# Patient Record
Sex: FEMALE | Race: WHITE | NOT HISPANIC OR LATINO | Employment: OTHER | URBAN - METROPOLITAN AREA
[De-identification: names, ages, dates, MRNs, and addresses within clinical notes are randomized per-mention and may not be internally consistent; named-entity substitution may affect disease eponyms.]

---

## 2021-01-05 ENCOUNTER — OFFICE VISIT (OUTPATIENT)
Dept: FAMILY MEDICINE | Facility: CLINIC | Age: 81
End: 2021-01-05
Payer: MEDICARE

## 2021-01-05 VITALS
OXYGEN SATURATION: 98 % | DIASTOLIC BLOOD PRESSURE: 68 MMHG | SYSTOLIC BLOOD PRESSURE: 132 MMHG | HEART RATE: 74 BPM | BODY MASS INDEX: 26.8 KG/M2 | WEIGHT: 132.94 LBS | TEMPERATURE: 98 F | HEIGHT: 59 IN

## 2021-01-05 DIAGNOSIS — M65.30 TRIGGER FINGER OF RIGHT HAND, UNSPECIFIED FINGER: ICD-10-CM

## 2021-01-05 DIAGNOSIS — E78.5 HYPERLIPIDEMIA, UNSPECIFIED HYPERLIPIDEMIA TYPE: ICD-10-CM

## 2021-01-05 DIAGNOSIS — Z00.00 ANNUAL PHYSICAL EXAM: Primary | ICD-10-CM

## 2021-01-05 DIAGNOSIS — M15.9 OSTEOARTHRITIS OF MULTIPLE JOINTS, UNSPECIFIED OSTEOARTHRITIS TYPE: ICD-10-CM

## 2021-01-05 DIAGNOSIS — Z95.0 PACEMAKER: ICD-10-CM

## 2021-01-05 PROCEDURE — 99999 PR PBB SHADOW E&M-NEW PATIENT-LVL V: CPT | Mod: PBBFAC,,, | Performed by: INTERNAL MEDICINE

## 2021-01-05 PROCEDURE — 99204 PR OFFICE/OUTPT VISIT, NEW, LEVL IV, 45-59 MIN: ICD-10-PCS | Mod: S$PBB,,, | Performed by: INTERNAL MEDICINE

## 2021-01-05 PROCEDURE — 99204 OFFICE O/P NEW MOD 45 MIN: CPT | Mod: S$PBB,,, | Performed by: INTERNAL MEDICINE

## 2021-01-05 PROCEDURE — 99999 PR PBB SHADOW E&M-NEW PATIENT-LVL V: ICD-10-PCS | Mod: PBBFAC,,, | Performed by: INTERNAL MEDICINE

## 2021-01-05 PROCEDURE — 99205 OFFICE O/P NEW HI 60 MIN: CPT | Mod: PBBFAC,PO | Performed by: INTERNAL MEDICINE

## 2021-01-05 RX ORDER — HYDROGEN PEROXIDE 3 %
10 SOLUTION, NON-ORAL MISCELLANEOUS
COMMUNITY
End: 2023-11-03

## 2021-01-05 RX ORDER — ASPIRIN 325 MG/1
325 TABLET, FILM COATED ORAL DAILY
COMMUNITY
End: 2023-11-03

## 2021-01-05 RX ORDER — NAPROXEN SODIUM 220 MG
220 TABLET ORAL 2 TIMES DAILY WITH MEALS
COMMUNITY
End: 2023-06-08

## 2021-01-06 ENCOUNTER — TELEPHONE (OUTPATIENT)
Dept: ORTHOPEDICS | Facility: CLINIC | Age: 81
End: 2021-01-06

## 2021-01-06 DIAGNOSIS — R52 PAIN: Primary | ICD-10-CM

## 2021-01-07 ENCOUNTER — HOSPITAL ENCOUNTER (OUTPATIENT)
Dept: RADIOLOGY | Facility: OTHER | Age: 81
Discharge: HOME | End: 2021-01-07
Attending: PHYSICIAN ASSISTANT
Payer: MEDICARE

## 2021-01-07 ENCOUNTER — OFFICE VISIT (OUTPATIENT)
Dept: ORTHOPEDICS | Facility: CLINIC | Age: 81
End: 2021-01-07
Payer: MEDICARE

## 2021-01-07 VITALS
WEIGHT: 132.94 LBS | HEART RATE: 78 BPM | HEIGHT: 59 IN | BODY MASS INDEX: 26.8 KG/M2 | SYSTOLIC BLOOD PRESSURE: 153 MMHG | DIASTOLIC BLOOD PRESSURE: 85 MMHG

## 2021-01-07 DIAGNOSIS — R52 PAIN: ICD-10-CM

## 2021-01-07 DIAGNOSIS — M65.331 TRIGGER MIDDLE FINGER OF RIGHT HAND: ICD-10-CM

## 2021-01-07 PROCEDURE — 99203 PR OFFICE/OUTPT VISIT, NEW, LEVL III, 30-44 MIN: ICD-10-PCS | Mod: 25,S$PBB,, | Performed by: PHYSICIAN ASSISTANT

## 2021-01-07 PROCEDURE — 99203 OFFICE O/P NEW LOW 30 MIN: CPT | Mod: 25,S$PBB,, | Performed by: PHYSICIAN ASSISTANT

## 2021-01-07 PROCEDURE — 73130 X-RAY EXAM OF HAND: CPT | Mod: TC,FY,RT

## 2021-01-07 PROCEDURE — 73130 X-RAY EXAM OF HAND: CPT | Mod: 26,RT,, | Performed by: RADIOLOGY

## 2021-01-07 PROCEDURE — 73130 XR HAND COMPLETE 3 VIEW RIGHT: ICD-10-PCS | Mod: 26,RT,, | Performed by: RADIOLOGY

## 2021-01-07 PROCEDURE — 20550 NJX 1 TENDON SHEATH/LIGAMENT: CPT | Mod: S$PBB,F7,, | Performed by: PHYSICIAN ASSISTANT

## 2021-01-07 PROCEDURE — 99214 OFFICE O/P EST MOD 30 MIN: CPT | Mod: PBBFAC,25 | Performed by: PHYSICIAN ASSISTANT

## 2021-01-07 PROCEDURE — 20550 NJX 1 TENDON SHEATH/LIGAMENT: CPT | Mod: PBBFAC | Performed by: PHYSICIAN ASSISTANT

## 2021-01-07 PROCEDURE — 20550 PR INJECT TENDON SHEATH/LIGAMENT: ICD-10-PCS | Mod: S$PBB,F7,, | Performed by: PHYSICIAN ASSISTANT

## 2021-01-07 PROCEDURE — 99999 PR PBB SHADOW E&M-EST. PATIENT-LVL IV: CPT | Mod: PBBFAC,,, | Performed by: PHYSICIAN ASSISTANT

## 2021-01-07 PROCEDURE — 99999 PR PBB SHADOW E&M-EST. PATIENT-LVL IV: ICD-10-PCS | Mod: PBBFAC,,, | Performed by: PHYSICIAN ASSISTANT

## 2021-01-07 RX ORDER — LIDOCAINE HYDROCHLORIDE 10 MG/ML
0.5 INJECTION, SOLUTION EPIDURAL; INFILTRATION; INTRACAUDAL; PERINEURAL
Status: COMPLETED | OUTPATIENT
Start: 2021-01-07 | End: 2021-01-07

## 2021-01-07 RX ORDER — DEXAMETHASONE SODIUM PHOSPHATE 4 MG/ML
4 INJECTION, SOLUTION INTRA-ARTICULAR; INTRALESIONAL; INTRAMUSCULAR; INTRAVENOUS; SOFT TISSUE
Status: COMPLETED | OUTPATIENT
Start: 2021-01-07 | End: 2021-01-07

## 2021-01-07 RX ADMIN — LIDOCAINE HYDROCHLORIDE 5 MG: 10 INJECTION, SOLUTION EPIDURAL; INFILTRATION; INTRACAUDAL; PERINEURAL at 04:01

## 2021-01-07 RX ADMIN — DEXAMETHASONE SODIUM PHOSPHATE 4 MG: 4 INJECTION INTRA-ARTICULAR; INTRALESIONAL; INTRAMUSCULAR; INTRAVENOUS; SOFT TISSUE at 04:01

## 2021-01-08 ENCOUNTER — IMMUNIZATION (OUTPATIENT)
Dept: INTERNAL MEDICINE | Facility: CLINIC | Age: 81
End: 2021-01-08
Payer: MEDICARE

## 2021-01-08 ENCOUNTER — OFFICE VISIT (OUTPATIENT)
Dept: OTOLARYNGOLOGY | Facility: CLINIC | Age: 81
End: 2021-01-08
Payer: MEDICARE

## 2021-01-08 VITALS
BODY MASS INDEX: 26.66 KG/M2 | WEIGHT: 132 LBS | HEART RATE: 78 BPM | SYSTOLIC BLOOD PRESSURE: 154 MMHG | DIASTOLIC BLOOD PRESSURE: 94 MMHG

## 2021-01-08 DIAGNOSIS — Z00.00 ANNUAL PHYSICAL EXAM: ICD-10-CM

## 2021-01-08 DIAGNOSIS — Z23 NEED FOR VACCINATION: ICD-10-CM

## 2021-01-08 PROCEDURE — 99999 PR PBB SHADOW E&M-EST. PATIENT-LVL III: ICD-10-PCS | Mod: PBBFAC,,, | Performed by: OTOLARYNGOLOGY

## 2021-01-08 PROCEDURE — 99499 NO LOS: ICD-10-PCS | Mod: S$PBB,,, | Performed by: OTOLARYNGOLOGY

## 2021-01-08 PROCEDURE — 99499 UNLISTED E&M SERVICE: CPT | Mod: S$PBB,,, | Performed by: OTOLARYNGOLOGY

## 2021-01-08 PROCEDURE — 99213 OFFICE O/P EST LOW 20 MIN: CPT | Mod: PBBFAC | Performed by: OTOLARYNGOLOGY

## 2021-01-08 PROCEDURE — 99999 PR PBB SHADOW E&M-EST. PATIENT-LVL III: CPT | Mod: PBBFAC,,, | Performed by: OTOLARYNGOLOGY

## 2021-01-08 PROCEDURE — 69210 PR REMOVAL IMPACTED CERUMEN REQUIRING INSTRUMENTATION, UNILATERAL: ICD-10-PCS | Mod: S$PBB,,, | Performed by: OTOLARYNGOLOGY

## 2021-01-08 PROCEDURE — 91300 COVID-19, MRNA, LNP-S, PF, 30 MCG/0.3 ML DOSE VACCINE: CPT | Mod: PBBFAC

## 2021-01-08 PROCEDURE — 69210 REMOVE IMPACTED EAR WAX UNI: CPT | Mod: 50,PBBFAC | Performed by: OTOLARYNGOLOGY

## 2021-01-08 PROCEDURE — 69210 REMOVE IMPACTED EAR WAX UNI: CPT | Mod: S$PBB,,, | Performed by: OTOLARYNGOLOGY

## 2021-01-12 ENCOUNTER — LAB VISIT (OUTPATIENT)
Dept: LAB | Facility: HOSPITAL | Age: 81
End: 2021-01-12
Attending: INTERNAL MEDICINE
Payer: MEDICARE

## 2021-01-12 ENCOUNTER — TELEPHONE (OUTPATIENT)
Dept: FAMILY MEDICINE | Facility: CLINIC | Age: 81
End: 2021-01-12

## 2021-01-12 ENCOUNTER — PATIENT MESSAGE (OUTPATIENT)
Dept: HEPATOLOGY | Facility: HOSPITAL | Age: 81
End: 2021-01-12

## 2021-01-12 DIAGNOSIS — E78.5 HYPERLIPIDEMIA, UNSPECIFIED HYPERLIPIDEMIA TYPE: Primary | ICD-10-CM

## 2021-01-12 DIAGNOSIS — Z00.00 ANNUAL PHYSICAL EXAM: ICD-10-CM

## 2021-01-12 LAB
25(OH)D3+25(OH)D2 SERPL-MCNC: 34 NG/ML (ref 30–96)
ALBUMIN SERPL BCP-MCNC: 3.7 G/DL (ref 3.5–5.2)
ALP SERPL-CCNC: 72 U/L (ref 55–135)
ALT SERPL W/O P-5'-P-CCNC: 14 U/L (ref 10–44)
ANION GAP SERPL CALC-SCNC: 8 MMOL/L (ref 8–16)
AST SERPL-CCNC: 20 U/L (ref 10–40)
BASOPHILS # BLD AUTO: 0.06 K/UL (ref 0–0.2)
BASOPHILS NFR BLD: 1.3 % (ref 0–1.9)
BILIRUB SERPL-MCNC: 0.9 MG/DL (ref 0.1–1)
BUN SERPL-MCNC: 22 MG/DL (ref 8–23)
CALCIUM SERPL-MCNC: 9.4 MG/DL (ref 8.7–10.5)
CHLORIDE SERPL-SCNC: 101 MMOL/L (ref 95–110)
CHOLEST SERPL-MCNC: 292 MG/DL (ref 120–199)
CHOLEST/HDLC SERPL: 4.8 {RATIO} (ref 2–5)
CO2 SERPL-SCNC: 30 MMOL/L (ref 23–29)
CREAT SERPL-MCNC: 0.7 MG/DL (ref 0.5–1.4)
DIFFERENTIAL METHOD: ABNORMAL
EOSINOPHIL # BLD AUTO: 0.2 K/UL (ref 0–0.5)
EOSINOPHIL NFR BLD: 4.6 % (ref 0–8)
ERYTHROCYTE [DISTWIDTH] IN BLOOD BY AUTOMATED COUNT: 13.5 % (ref 11.5–14.5)
EST. GFR  (AFRICAN AMERICAN): >60 ML/MIN/1.73 M^2
EST. GFR  (NON AFRICAN AMERICAN): >60 ML/MIN/1.73 M^2
ESTIMATED AVG GLUCOSE: 100 MG/DL (ref 68–131)
GLUCOSE SERPL-MCNC: 90 MG/DL (ref 70–110)
HBA1C MFR BLD HPLC: 5.1 % (ref 4–5.6)
HCT VFR BLD AUTO: 48.3 % (ref 37–48.5)
HDLC SERPL-MCNC: 61 MG/DL (ref 40–75)
HDLC SERPL: 20.9 % (ref 20–50)
HGB BLD-MCNC: 15 G/DL (ref 12–16)
IMM GRANULOCYTES # BLD AUTO: 0 K/UL (ref 0–0.04)
IMM GRANULOCYTES NFR BLD AUTO: 0 % (ref 0–0.5)
LDLC SERPL CALC-MCNC: 202.8 MG/DL (ref 63–159)
LYMPHOCYTES # BLD AUTO: 1.1 K/UL (ref 1–4.8)
LYMPHOCYTES NFR BLD: 24.6 % (ref 18–48)
MCH RBC QN AUTO: 29.8 PG (ref 27–31)
MCHC RBC AUTO-ENTMCNC: 31.1 G/DL (ref 32–36)
MCV RBC AUTO: 96 FL (ref 82–98)
MONOCYTES # BLD AUTO: 0.4 K/UL (ref 0.3–1)
MONOCYTES NFR BLD: 8.7 % (ref 4–15)
NEUTROPHILS # BLD AUTO: 2.8 K/UL (ref 1.8–7.7)
NEUTROPHILS NFR BLD: 60.8 % (ref 38–73)
NONHDLC SERPL-MCNC: 231 MG/DL
NRBC BLD-RTO: 0 /100 WBC
PLATELET # BLD AUTO: 326 K/UL (ref 150–350)
PMV BLD AUTO: 9.8 FL (ref 9.2–12.9)
POTASSIUM SERPL-SCNC: 4 MMOL/L (ref 3.5–5.1)
PROT SERPL-MCNC: 7.3 G/DL (ref 6–8.4)
RBC # BLD AUTO: 5.03 M/UL (ref 4–5.4)
SODIUM SERPL-SCNC: 139 MMOL/L (ref 136–145)
TRIGL SERPL-MCNC: 141 MG/DL (ref 30–150)
TSH SERPL DL<=0.005 MIU/L-ACNC: 3.46 UIU/ML (ref 0.4–4)
WBC # BLD AUTO: 4.6 K/UL (ref 3.9–12.7)

## 2021-01-12 PROCEDURE — 80061 LIPID PANEL: CPT

## 2021-01-12 PROCEDURE — 85025 COMPLETE CBC W/AUTO DIFF WBC: CPT

## 2021-01-12 PROCEDURE — 82306 VITAMIN D 25 HYDROXY: CPT

## 2021-01-12 PROCEDURE — 83036 HEMOGLOBIN GLYCOSYLATED A1C: CPT

## 2021-01-12 PROCEDURE — 84443 ASSAY THYROID STIM HORMONE: CPT

## 2021-01-12 PROCEDURE — 36415 COLL VENOUS BLD VENIPUNCTURE: CPT | Mod: PN

## 2021-01-12 PROCEDURE — 80053 COMPREHEN METABOLIC PANEL: CPT

## 2021-01-13 ENCOUNTER — TELEPHONE (OUTPATIENT)
Dept: FAMILY MEDICINE | Facility: CLINIC | Age: 81
End: 2021-01-13

## 2021-01-29 ENCOUNTER — IMMUNIZATION (OUTPATIENT)
Dept: INTERNAL MEDICINE | Facility: CLINIC | Age: 81
End: 2021-01-29
Payer: MEDICARE

## 2021-01-29 DIAGNOSIS — Z23 NEED FOR VACCINATION: Primary | ICD-10-CM

## 2021-01-29 PROCEDURE — 91300 COVID-19, MRNA, LNP-S, PF, 30 MCG/0.3 ML DOSE VACCINE: CPT | Mod: PBBFAC | Performed by: INTERNAL MEDICINE

## 2021-01-29 PROCEDURE — 0002A COVID-19, MRNA, LNP-S, PF, 30 MCG/0.3 ML DOSE VACCINE: CPT | Mod: PBBFAC | Performed by: INTERNAL MEDICINE

## 2021-03-01 ENCOUNTER — DOCUMENTATION ONLY (OUTPATIENT)
Dept: ORTHOPEDICS | Facility: CLINIC | Age: 81
End: 2021-03-01

## 2021-05-19 ENCOUNTER — OFFICE VISIT (OUTPATIENT)
Dept: OTOLARYNGOLOGY | Facility: CLINIC | Age: 81
End: 2021-05-19
Payer: MEDICARE

## 2021-05-19 DIAGNOSIS — H61.21 IMPACTED CERUMEN OF RIGHT EAR: Primary | ICD-10-CM

## 2021-05-19 DIAGNOSIS — R42 DIZZINESS: ICD-10-CM

## 2021-05-19 PROCEDURE — 69210 EAR CERUMEN REMOVAL: ICD-10-PCS | Mod: S$PBB,,, | Performed by: NURSE PRACTITIONER

## 2021-05-19 PROCEDURE — 99213 OFFICE O/P EST LOW 20 MIN: CPT | Mod: 25,S$PBB,, | Performed by: NURSE PRACTITIONER

## 2021-05-19 PROCEDURE — 69210 REMOVE IMPACTED EAR WAX UNI: CPT | Mod: PBBFAC,PN | Performed by: NURSE PRACTITIONER

## 2021-05-19 PROCEDURE — 99213 PR OFFICE/OUTPT VISIT, EST, LEVL III, 20-29 MIN: ICD-10-PCS | Mod: 25,S$PBB,, | Performed by: NURSE PRACTITIONER

## 2021-05-19 PROCEDURE — 99212 OFFICE O/P EST SF 10 MIN: CPT | Mod: PBBFAC,PN,25 | Performed by: NURSE PRACTITIONER

## 2021-05-19 PROCEDURE — 99999 PR PBB SHADOW E&M-EST. PATIENT-LVL II: ICD-10-PCS | Mod: PBBFAC,,, | Performed by: NURSE PRACTITIONER

## 2021-05-19 PROCEDURE — 69210 REMOVE IMPACTED EAR WAX UNI: CPT | Mod: S$PBB,,, | Performed by: NURSE PRACTITIONER

## 2021-05-19 PROCEDURE — 99999 PR PBB SHADOW E&M-EST. PATIENT-LVL II: CPT | Mod: PBBFAC,,, | Performed by: NURSE PRACTITIONER

## 2021-12-29 ENCOUNTER — TELEPHONE (OUTPATIENT)
Dept: FAMILY MEDICINE | Facility: CLINIC | Age: 81
End: 2021-12-29
Payer: MEDICARE

## 2021-12-29 RX ORDER — CYCLOBENZAPRINE HCL 5 MG
5 TABLET ORAL 2 TIMES DAILY PRN
Qty: 30 TABLET | Refills: 1 | Status: SHIPPED | OUTPATIENT
Start: 2021-12-29 | End: 2022-01-06 | Stop reason: SDUPTHER

## 2022-01-06 RX ORDER — CYCLOBENZAPRINE HCL 5 MG
5 TABLET ORAL 2 TIMES DAILY PRN
Qty: 30 TABLET | Refills: 1 | Status: SHIPPED | OUTPATIENT
Start: 2022-01-06 | End: 2022-01-16

## 2022-01-10 ENCOUNTER — PATIENT MESSAGE (OUTPATIENT)
Dept: ADMINISTRATIVE | Facility: HOSPITAL | Age: 82
End: 2022-01-10
Payer: MEDICARE

## 2022-03-24 ENCOUNTER — PES CALL (OUTPATIENT)
Dept: ADMINISTRATIVE | Facility: CLINIC | Age: 82
End: 2022-03-24
Payer: MEDICARE

## 2022-03-30 ENCOUNTER — IMMUNIZATION (OUTPATIENT)
Dept: INTERNAL MEDICINE | Facility: CLINIC | Age: 82
End: 2022-03-30
Payer: MEDICARE

## 2022-03-30 ENCOUNTER — OFFICE VISIT (OUTPATIENT)
Dept: INTERNAL MEDICINE | Facility: CLINIC | Age: 82
End: 2022-03-30
Payer: MEDICARE

## 2022-03-30 VITALS
OXYGEN SATURATION: 98 % | BODY MASS INDEX: 28.58 KG/M2 | HEIGHT: 59 IN | SYSTOLIC BLOOD PRESSURE: 160 MMHG | HEART RATE: 74 BPM | DIASTOLIC BLOOD PRESSURE: 83 MMHG | WEIGHT: 141.75 LBS

## 2022-03-30 DIAGNOSIS — Z00.00 ENCOUNTER FOR PREVENTIVE HEALTH EXAMINATION: Primary | ICD-10-CM

## 2022-03-30 DIAGNOSIS — Z23 NEED FOR VACCINATION: Primary | ICD-10-CM

## 2022-03-30 DIAGNOSIS — H91.90 HEARING LOSS, UNSPECIFIED HEARING LOSS TYPE, UNSPECIFIED LATERALITY: ICD-10-CM

## 2022-03-30 PROCEDURE — 99999 PR PBB SHADOW E&M-EST. PATIENT-LVL IV: CPT | Mod: PBBFAC,,, | Performed by: NURSE PRACTITIONER

## 2022-03-30 PROCEDURE — G0439 PR MEDICARE ANNUAL WELLNESS SUBSEQUENT VISIT: ICD-10-PCS | Mod: ,,, | Performed by: NURSE PRACTITIONER

## 2022-03-30 PROCEDURE — G0439 PPPS, SUBSEQ VISIT: HCPCS | Mod: ,,, | Performed by: NURSE PRACTITIONER

## 2022-03-30 PROCEDURE — 99999 PR PBB SHADOW E&M-EST. PATIENT-LVL IV: ICD-10-PCS | Mod: PBBFAC,,, | Performed by: NURSE PRACTITIONER

## 2022-03-30 PROCEDURE — 91305 COVID-19, MRNA, LNP-S, PF, 30 MCG/0.3 ML DOSE VACCINE (PFIZER): CPT | Mod: PBBFAC

## 2022-03-30 PROCEDURE — 99214 OFFICE O/P EST MOD 30 MIN: CPT | Mod: PBBFAC | Performed by: NURSE PRACTITIONER

## 2022-03-30 RX ORDER — VIT C/E/ZN/COPPR/LUTEIN/ZEAXAN 250MG-90MG
1000 CAPSULE ORAL 2 TIMES DAILY
COMMUNITY

## 2022-03-30 NOTE — PROGRESS NOTES
"  Dorothy Mantilla presented for a  Medicare AWV and comprehensive Health Risk Assessment today. The following components were reviewed and updated:    · Medical history  · Family History  · Social history  · Allergies and Current Medications  · Health Risk Assessment  · Health Maintenance  · Care Team         ** See Completed Assessments for Annual Wellness Visit within the encounter summary.**         The following assessments were completed:  · Living Situation  · CAGE  · Depression Screening  · Timed Get Up and Go  · Whisper Test  · Cognitive Function Screening  · Nutrition Screening  · ADL Screening  · PAQ Screening            Vitals:    03/30/22 1108 03/30/22 1235   BP: (!) 172/87 (!) 160/83   Pulse: 74    SpO2: 98%    Weight: 64.3 kg (141 lb 12.1 oz)    Height: 4' 11" (1.499 m)      Body mass index is 28.63 kg/m².     Physical Exam  Constitutional:       Appearance: She is well-developed.   HENT:      Head: Normocephalic and atraumatic. Not macrocephalic and not microcephalic. No raccoon eyes, Ray's sign, abrasion, contusion, right periorbital erythema, left periorbital erythema or laceration. Hair is normal.      Right Ear: No decreased hearing noted. No laceration, drainage, swelling or tenderness. No middle ear effusion. No foreign body. No mastoid tenderness. No hemotympanum. Tympanic membrane is not injected, scarred, perforated, erythematous, retracted or bulging. Tympanic membrane has normal mobility.      Left Ear: No decreased hearing noted. No laceration, drainage, swelling or tenderness.  No middle ear effusion. No foreign body. No mastoid tenderness. No hemotympanum. Tympanic membrane is not injected, scarred, perforated, erythematous, retracted or bulging. Tympanic membrane has normal mobility.      Nose: Nose normal. No nasal deformity, laceration, mucosal edema or rhinorrhea.      Mouth/Throat:      Pharynx: Uvula midline.   Eyes:      General: Lids are normal. No scleral icterus.     " Conjunctiva/sclera: Conjunctivae normal.   Neck:      Thyroid: No thyroid mass or thyromegaly.      Trachea: Trachea normal.   Cardiovascular:      Rate and Rhythm: Normal rate and regular rhythm.   Pulmonary:      Effort: Pulmonary effort is normal. No respiratory distress.      Breath sounds: Normal breath sounds.   Abdominal:      Palpations: Abdomen is soft.   Musculoskeletal:         General: Normal range of motion.      Cervical back: Neck supple. No edema or erythema. No spinous process tenderness or muscular tenderness. Normal range of motion.   Lymphadenopathy:      Head:      Right side of head: No submental, submandibular, tonsillar, preauricular or posterior auricular adenopathy.      Left side of head: No submental, submandibular, tonsillar, preauricular, posterior auricular or occipital adenopathy.   Skin:     General: Skin is warm and dry.   Neurological:      Mental Status: She is alert and oriented to person, place, and time.   Psychiatric:         Behavior: Behavior normal.         Thought Content: Thought content normal.         Judgment: Judgment normal.             Diagnoses and health risks identified today and associated recommendations/orders:    1. Encounter for preventive health examination  Annual Health Risk Assessment (HRA) visit today.  Counseling and referral of health maintenance and preventative health measures performed.  Patient given annual wellness paperwork to take home.  Encouraged to return in 1 year for subsequent HRA visit.     2. Hearing loss, unspecified hearing loss type, unspecified laterality  - Ambulatory referral/consult to ENT; Future      Provided Dorothy with a 5-10 year written screening schedule and personal prevention plan. Recommendations were developed using the USPSTF age appropriate recommendations. Education, counseling, and referrals were provided as needed. After Visit Summary printed and given to patient which includes a list of additional  screenings\tests needed.      I offered to discuss end of life issues, including information on how to make advance directives that the patient could use to name someone who would make medical decisions on their behalf if they became too ill to make themselves.    ___Patient declined  _X_Patient is interested, I provided paper work and offered to discuss.    Follow up in about 1 year (around 3/30/2023).    Killian Rm NP  I offered to discuss advanced care planning, including how to pick a person who would make decisions for you if you were unable to make them for yourself, called a health care power of , and what kind of decisions you might make such as use of life sustaining treatments such as ventilators and tube feeding when faced with a life limiting illness recorded on a living will that they will need to know. (How you want to be cared for as you near the end of your natural life)     X Patient is interested in learning more about how to make advanced directives.  I provided them paperwork and offered to discuss this with them.

## 2022-03-30 NOTE — PATIENT INSTRUCTIONS
Counseling and Referral of Other Preventative  (Italic type indicates deductible and co-insurance are waived)    Patient Name: Dorothy Mantilla  Today's Date: 3/30/2022    Health Maintenance       Date Due Completion Date    TETANUS VACCINE Never done ---    Pneumococcal Vaccines (Age 65+) (1 of 1 - PPSV23) Never done ---    COVID-19 Vaccine (3 - Booster for Pfizer series) 06/29/2021 1/29/2021    Influenza Vaccine (1) 09/01/2021 10/25/2019    DEXA Scan 06/11/2024 6/11/2021    Lipid Panel 06/03/2026 6/3/2021        No orders of the defined types were placed in this encounter.    The following information is provided to all patients.  This information is to help you find resources for any of the problems found today that may be affecting your health:                Living healthy guide: www.[facility].[place].gov      Understanding Diabetes: www.diabetes.org      Eating healthy: www.cdc.gov/healthyweight      [facility] home safety checklist: www.cdc.gov/steadi/patient.html      Agency on Aging: www.goea.[place].[facility]      Alcoholics anonymous (AA): www.aa.org      Physical Activity: www.faith.nih.gov/dc5pyfg      Tobacco use: www.quitwithusla.org

## 2022-04-07 ENCOUNTER — OFFICE VISIT (OUTPATIENT)
Dept: FAMILY MEDICINE | Facility: CLINIC | Age: 82
End: 2022-04-07
Payer: MEDICARE

## 2022-04-07 VITALS
SYSTOLIC BLOOD PRESSURE: 126 MMHG | OXYGEN SATURATION: 97 % | HEIGHT: 59 IN | BODY MASS INDEX: 28.63 KG/M2 | HEART RATE: 78 BPM | TEMPERATURE: 97 F | WEIGHT: 142 LBS | DIASTOLIC BLOOD PRESSURE: 72 MMHG

## 2022-04-07 DIAGNOSIS — Z95.0 PACEMAKER: ICD-10-CM

## 2022-04-07 DIAGNOSIS — E78.5 HYPERLIPIDEMIA, UNSPECIFIED HYPERLIPIDEMIA TYPE: ICD-10-CM

## 2022-04-07 DIAGNOSIS — Z00.00 ANNUAL PHYSICAL EXAM: Primary | ICD-10-CM

## 2022-04-07 DIAGNOSIS — M15.9 OSTEOARTHRITIS OF MULTIPLE JOINTS, UNSPECIFIED OSTEOARTHRITIS TYPE: ICD-10-CM

## 2022-04-07 DIAGNOSIS — Z79.899 MEDICATION MANAGEMENT: ICD-10-CM

## 2022-04-07 PROCEDURE — 99214 PR OFFICE/OUTPT VISIT, EST, LEVL IV, 30-39 MIN: ICD-10-PCS | Mod: S$PBB,,, | Performed by: INTERNAL MEDICINE

## 2022-04-07 PROCEDURE — 99999 PR PBB SHADOW E&M-EST. PATIENT-LVL III: CPT | Mod: PBBFAC,,, | Performed by: INTERNAL MEDICINE

## 2022-04-07 PROCEDURE — 99214 OFFICE O/P EST MOD 30 MIN: CPT | Mod: S$PBB,,, | Performed by: INTERNAL MEDICINE

## 2022-04-07 PROCEDURE — 99999 PR PBB SHADOW E&M-EST. PATIENT-LVL III: ICD-10-PCS | Mod: PBBFAC,,, | Performed by: INTERNAL MEDICINE

## 2022-04-07 PROCEDURE — 99213 OFFICE O/P EST LOW 20 MIN: CPT | Mod: PBBFAC,PO | Performed by: INTERNAL MEDICINE

## 2022-04-07 NOTE — PROGRESS NOTES
Subjective:       Patient ID: Dorothy Mantilla is a 82 y.o. female.    Chief Complaint: Annual Exam      HPI  Dorothy Mantilla is a 82 y.o. 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    Reports she had her knee surgeries without complications and completed her therapy.  She is ambulating without assistance and pain is improved.  Has gained some weight as she was not exercising as she used to.  She used to walk multiple miles a day before her surgery.    Has history of hyperlipidemia and only tolerates low doses of Lipitor before she starts feeling weak.  Currently not taking it.  Last labs had high cholesterol and LDL.  Her cholesterol was 292 and her .  Takes a baby aspirin.  Never had thromboembolic events.    Has been having a pain on the right temporomandibular area.  Denies trouble chewing, taking sounds or sensation, or earaches.  Has been present for a while.  Takes anti-inflammatories with some improvement.    She has no toxic habits.  Is about to go on vacation to [place] in a few days on their honeymoon.    Reports she have mammogram within 2 years.  All chart not available.    Reports her vaccines are up-to-date, including flu.    Health Maintenance:  Health Maintenance   Topic Date Due    TETANUS VACCINE  Never done    DEXA Scan  06/11/2024    Lipid Panel  06/03/2026       Review of Systems   Constitutional: Negative for chills, fever and unexpected weight change (Gained weight).   HENT:        Pain in right TMJ area   Respiratory: Negative.    Cardiovascular: Negative.    Gastrointestinal: Negative.    Genitourinary: Negative.    Musculoskeletal: Positive for arthralgias.   Neurological: Negative for weakness and headaches.   Psychiatric/Behavioral: Negative for sleep disturbance.      Past Medical History:   Diagnosis Date    Bradycardia     GERD (gastroesophageal reflux disease)      Hyperlipidemia        Past Surgical History:   Procedure Laterality Date    ADENOIDECTOMY      APPENDECTOMY      BRAIN SURGERY Left     lumpectomy    BUNIONECTOMY Bilateral     COSMETIC SURGERY      JOINT REPLACEMENT Bilateral     knee replacements    SURGICAL REMOVAL OF EXOSTOSIS OF TOE      one toe on each foot    TONSILLECTOMY         Family History   Problem Relation Age of Onset    Hypertension Mother          at age 99    Diabetes Mother     Parkinsonism Father     Hypertension Father     Colon cancer Father     Cancer Daughter         skin    Basal cell carcinoma Son     Stroke Paternal Grandfather     Hypertension Paternal Grandfather        Social History     Socioeconomic History    Marital status: Single   Tobacco Use    Smoking status: Never Smoker    Smokeless tobacco: Never Used   Substance and Sexual Activity    Alcohol use: Yes     Alcohol/week: 4.0 standard drinks     Types: 4 Glasses of wine per week    Drug use: Not Currently    Sexual activity: Yes     Partners: Male       Current Outpatient Medications   Medication Sig Dispense Refill    aspirin (BUFFERIN) 325 MG Tab Take 325 mg by mouth once daily. 2 tab      cholecalciferol, vitamin D3, (VITAMIN D3) 25 mcg (1,000 unit) capsule Take 1,000 Units by mouth 2 (two) times daily.      esomeprazole (NEXIUM) 20 MG capsule Take 20 mg by mouth before breakfast.      naproxen sodium (ANAPROX) 220 MG tablet Take 220 mg by mouth 2 (two) times daily with meals. 2 tab       No current facility-administered medications for this visit.       Review of patient's allergies indicates:   Allergen Reactions    Codeine Hives and Edema    Ciprofloxacin Diarrhea and Nausea And Vomiting         Objective:       Last 3 sets of Vitals    Vitals - 1 value per visit 3/30/2022 2022 2022   SYSTOLIC 160 - 126   DIASTOLIC 83 - 72   Pulse - - 78   Temp - - 97.4   SPO2 - - 97   Weight (lb) - - 141.98   Weight (kg) - - 64.4   Height - - 59    BMI (Calculated) - - 28.7   VISIT REPORT - - -   Pain Score  - 4 -   Physical Exam  Constitutional:       General: She is not in acute distress.     Appearance: Normal appearance.   HENT:      Head: Normocephalic.      Right Ear: Tympanic membrane, ear canal and external ear normal.      Left Ear: Tympanic membrane, ear canal and external ear normal.      Ears:      Comments: No tenderness to palpation on TMJ area or crepitance     Nose: Nose normal.      Mouth/Throat:      Mouth: Mucous membranes are moist.      Pharynx: Oropharynx is clear.   Eyes:      General: No scleral icterus.     Extraocular Movements: Extraocular movements intact.      Conjunctiva/sclera: Conjunctivae normal.      Pupils: Pupils are equal, round, and reactive to light.   Neck:      Vascular: No carotid bruit.      Comments: No goiter.  Cardiovascular:      Rate and Rhythm: Normal rate and regular rhythm.      Pulses: Normal pulses.      Heart sounds: Normal heart sounds.   Pulmonary:      Effort: Pulmonary effort is normal.      Breath sounds: Normal breath sounds.   Abdominal:      General: Bowel sounds are normal. There is no distension.      Palpations: Abdomen is soft. There is no mass.      Tenderness: There is no abdominal tenderness.   Musculoskeletal:         General: No swelling. Normal range of motion.      Cervical back: Neck supple.      Comments: Healed  surgical sites in both needs   Lymphadenopathy:      Cervical: No cervical adenopathy.   Skin:     General: Skin is warm and dry.   Neurological:      General: No focal deficit present.      Mental Status: She is alert and oriented to person, place, and time.   Psychiatric:         Mood and Affect: Mood normal.         Behavior: Behavior normal.           CBC:  Recent Labs   Lab 01/12/21  0805   WBC 4.60   RBC 5.03   Hemoglobin 15.0   Hematocrit 48.3   Platelets 326   MCV 96   MCH 29.8   MCHC 31.1 L     CMP:  Recent Labs   Lab 01/12/21  0805   Glucose 90   Calcium 9.4    Albumin 3.7   Total Protein 7.3   Sodium 139   Potassium 4.0   CO2 30 H   Chloride 101   BUN 22   Creatinine 0.7   Alkaline Phosphatase 72   ALT 14   AST 20   Total Bilirubin 0.9     URINALYSIS:       LIPIDS:  Recent Labs   Lab 01/12/21  0805   TSH 3.459   HDL 61   Cholesterol 292 H   Triglycerides 141   LDL Cholesterol 202.8 H   HDL/Cholesterol Ratio 20.9   Non-HDL Cholesterol 231   Total Cholesterol/HDL Ratio 4.8     TSH:  Recent Labs   Lab 01/12/21  0805   TSH 3.459       A1C:  Recent Labs   Lab 01/12/21  0805   Hemoglobin A1C 5.1       Imaging:  X-Ray Hand Complete Right  Narrative: EXAMINATION:  XR HAND COMPLETE 3 VIEW RIGHT    CLINICAL HISTORY:  Pain, unspecified    TECHNIQUE:  PA, lateral, and oblique views of the right hand were performed.    COMPARISON:  None    FINDINGS:  Scattered degenerative changes, most prominent at the 1st CMC and IP joints, noting joint space loss and osteophytosis.  No fracture or marrow replacement process.  The alignment is within normal limits.  Impression: No acute findings.    Electronically signed by: Aron Huggins MD  Date:    01/07/2021  Time:    15:49      Assessment:       1. Annual physical exam    2. Hyperlipidemia, unspecified hyperlipidemia type    3. Pacemaker    4. Osteoarthritis of multiple joints, unspecified osteoarthritis type    5. Medication management          Chronic conditions status updated as per HPI. Other than changes above, cont current medications and maintain follow up with specialist. Return to clinic after returns from her vacation.  Plan:       Dorothy was seen today for annual exam.    Diagnoses and all orders for this visit:    Annual physical exam  -     CBC Auto Differential; Future  -     Comprehensive Metabolic Panel; Future  -     Lipid Panel; Future  -     TSH; Future  -     Vitamin D; Future  - normal vital signs and slightly overweight but healthy.  - will discuss bone density, get previous mammogram, and previous screening  test    Hyperlipidemia, unspecified hyperlipidemia type  -     Comprehensive Metabolic Panel; Future  -     Lipid Panel; Future  -     TSH; Future  - a different statin or Zetia to be considered  - lifestyle modifications with diet and exercise with weight control recommended    Pacemaker   - clinically stable    Osteoarthritis of multiple joints, unspecified osteoarthritis type   - status post knee replacement with good recovery.    Medication management  -     Vitamin D; Future      Health Maintenance Due   Topic Date Due    TETANUS VACCINE  Never done    Pneumococcal Vaccines (Age 65+) (1 of 1 - PPSV23) Never done    Influenza Vaccine (1) 09/01/2021        Opal Zavala MD  [facility]  Disclaimer:  This note has been generated using voice-recognition software. There may be grammatical or spelling errors that have been missed during proof-reading

## 2022-06-29 ENCOUNTER — TELEPHONE (OUTPATIENT)
Dept: OTOLARYNGOLOGY | Facility: CLINIC | Age: 82
End: 2022-06-29
Payer: MEDICARE

## 2022-06-29 NOTE — TELEPHONE ENCOUNTER
Spoke with pt to reschedule an appointment. Appointment cancelled, Pt will call back when ready to schedule

## 2023-03-29 ENCOUNTER — PES CALL (OUTPATIENT)
Dept: ADMINISTRATIVE | Facility: CLINIC | Age: 83
End: 2023-03-29
Payer: MEDICARE

## 2023-04-08 ENCOUNTER — HOSPITAL ENCOUNTER (EMERGENCY)
Facility: HOSPITAL | Age: 83
Discharge: HOME | End: 2023-04-08
Attending: EMERGENCY MEDICINE
Payer: MEDICARE

## 2023-04-08 VITALS
BODY MASS INDEX: 29.23 KG/M2 | DIASTOLIC BLOOD PRESSURE: 72 MMHG | HEART RATE: 72 BPM | RESPIRATION RATE: 25 BRPM | HEIGHT: 59 IN | SYSTOLIC BLOOD PRESSURE: 157 MMHG | OXYGEN SATURATION: 95 % | WEIGHT: 145 LBS | TEMPERATURE: 98 F

## 2023-04-08 DIAGNOSIS — Z53.29 LEFT AGAINST MEDICAL ADVICE: ICD-10-CM

## 2023-04-08 DIAGNOSIS — K62.5 RECTAL BLEEDING: Primary | ICD-10-CM

## 2023-04-08 LAB
ABO + RH BLD: NORMAL
ALBUMIN SERPL BCP-MCNC: 3.5 G/DL (ref 3.5–5.2)
ALP SERPL-CCNC: 71 U/L (ref 55–135)
ALT SERPL W/O P-5'-P-CCNC: 13 U/L (ref 10–44)
ANION GAP SERPL CALC-SCNC: 11 MMOL/L (ref 8–16)
AST SERPL-CCNC: 22 U/L (ref 10–40)
BASOPHILS # BLD AUTO: 0.05 K/UL (ref 0–0.2)
BASOPHILS NFR BLD: 0.8 % (ref 0–1.9)
BILIRUB SERPL-MCNC: 0.7 MG/DL (ref 0.1–1)
BLD GP AB SCN CELLS X3 SERPL QL: NORMAL
BUN SERPL-MCNC: 18 MG/DL (ref 8–23)
CALCIUM SERPL-MCNC: 9.2 MG/DL (ref 8.7–10.5)
CHLORIDE SERPL-SCNC: 104 MMOL/L (ref 95–110)
CO2 SERPL-SCNC: 23 MMOL/L (ref 23–29)
CREAT SERPL-MCNC: 0.7 MG/DL (ref 0.5–1.4)
DIFFERENTIAL METHOD: NORMAL
EOSINOPHIL # BLD AUTO: 0.2 K/UL (ref 0–0.5)
EOSINOPHIL NFR BLD: 3.7 % (ref 0–8)
ERYTHROCYTE [DISTWIDTH] IN BLOOD BY AUTOMATED COUNT: 13.8 % (ref 11.5–14.5)
EST. GFR  (NO RACE VARIABLE): >60 ML/MIN/1.73 M^2
GLUCOSE SERPL-MCNC: 80 MG/DL (ref 70–110)
HCT VFR BLD AUTO: 43.4 % (ref 37–48.5)
HCV AB SERPL QL IA: NORMAL
HGB BLD-MCNC: 14 G/DL (ref 12–16)
HIV 1+2 AB+HIV1 P24 AG SERPL QL IA: NORMAL
IMM GRANULOCYTES # BLD AUTO: 0.01 K/UL (ref 0–0.04)
IMM GRANULOCYTES NFR BLD AUTO: 0.2 % (ref 0–0.5)
LYMPHOCYTES # BLD AUTO: 1.3 K/UL (ref 1–4.8)
LYMPHOCYTES NFR BLD: 19.9 % (ref 18–48)
MCH RBC QN AUTO: 30.4 PG (ref 27–31)
MCHC RBC AUTO-ENTMCNC: 32.3 G/DL (ref 32–36)
MCV RBC AUTO: 94 FL (ref 82–98)
MONOCYTES # BLD AUTO: 0.6 K/UL (ref 0.3–1)
MONOCYTES NFR BLD: 8.7 % (ref 4–15)
NEUTROPHILS # BLD AUTO: 4.4 K/UL (ref 1.8–7.7)
NEUTROPHILS NFR BLD: 66.7 % (ref 38–73)
NRBC BLD-RTO: 0 /100 WBC
PLATELET # BLD AUTO: 265 K/UL (ref 150–450)
PMV BLD AUTO: 9.2 FL (ref 9.2–12.9)
POTASSIUM SERPL-SCNC: 3.5 MMOL/L (ref 3.5–5.1)
PROT SERPL-MCNC: 6.6 G/DL (ref 6–8.4)
RBC # BLD AUTO: 4.61 M/UL (ref 4–5.4)
SODIUM SERPL-SCNC: 138 MMOL/L (ref 136–145)
SPECIMEN OUTDATE: NORMAL
WBC # BLD AUTO: 6.53 K/UL (ref 3.9–12.7)

## 2023-04-08 PROCEDURE — 99284 PR EMERGENCY DEPT VISIT,LEVEL IV: ICD-10-PCS | Mod: ,,, | Performed by: EMERGENCY MEDICINE

## 2023-04-08 PROCEDURE — 94761 N-INVAS EAR/PLS OXIMETRY MLT: CPT

## 2023-04-08 PROCEDURE — 85025 COMPLETE CBC W/AUTO DIFF WBC: CPT | Performed by: STUDENT IN AN ORGANIZED HEALTH CARE EDUCATION/TRAINING PROGRAM

## 2023-04-08 PROCEDURE — 87389 HIV-1 AG W/HIV-1&-2 AB AG IA: CPT | Performed by: PHYSICIAN ASSISTANT

## 2023-04-08 PROCEDURE — 99283 EMERGENCY DEPT VISIT LOW MDM: CPT

## 2023-04-08 PROCEDURE — 86900 BLOOD TYPING SEROLOGIC ABO: CPT | Performed by: STUDENT IN AN ORGANIZED HEALTH CARE EDUCATION/TRAINING PROGRAM

## 2023-04-08 PROCEDURE — 86803 HEPATITIS C AB TEST: CPT | Performed by: PHYSICIAN ASSISTANT

## 2023-04-08 PROCEDURE — 80053 COMPREHEN METABOLIC PANEL: CPT | Performed by: STUDENT IN AN ORGANIZED HEALTH CARE EDUCATION/TRAINING PROGRAM

## 2023-04-08 PROCEDURE — 99284 EMERGENCY DEPT VISIT MOD MDM: CPT | Mod: ,,, | Performed by: EMERGENCY MEDICINE

## 2023-04-08 NOTE — DISCHARGE INSTRUCTIONS
Please return to the ER if there is any symptom development including chest pain, shortness of breath, lightheadedness or persistent rectal bleeding.

## 2023-04-08 NOTE — ED PROVIDER NOTES
Encounter Date: 2023       History     Chief Complaint   Patient presents with    Rectal Bleeding     Last time got blood transfusion     Patient is an 83-year-old female with a past medical history of hyperlipidemia, GERD and bradycardia (presumably complete heart block) s/p ppm presenting to the ED for complaints of rectal bleeding that began today.  She is had 2 episodes of bright red blood that is mixed in with her stool.  She is concerned as she had similar symptoms about 15 years ago and required transfusion.  She is not on anticoagulation.  She denies any associated chest pain, shortness of breath or lightheadedness.  Endorsed nausea, that is now resolved, but has had no vomiting, abdominal pain or rectal pain.    Review of patient's allergies indicates:   Allergen Reactions    Codeine Hives and Edema    Latex, natural rubber Hives    Ciprofloxacin Diarrhea and Nausea And Vomiting     Past Medical History:   Diagnosis Date    Bradycardia     GERD (gastroesophageal reflux disease)     Hyperlipidemia      Past Surgical History:   Procedure Laterality Date    ADENOIDECTOMY      APPENDECTOMY      BRAIN SURGERY Left     lumpectomy    BUNIONECTOMY Bilateral     COSMETIC SURGERY      INSERTION OF PACEMAKER      JOINT REPLACEMENT Bilateral     knee replacements    SURGICAL REMOVAL OF EXOSTOSIS OF TOE      one toe on each foot    TONSILLECTOMY       Family History   Problem Relation Age of Onset    Hypertension Mother          at age 99    Diabetes Mother     Parkinsonism Father     Hypertension Father     Colon cancer Father     Cancer Daughter         skin    Basal cell carcinoma Son     Stroke Paternal Grandfather     Hypertension Paternal Grandfather      Social History     Tobacco Use    Smoking status: Never    Smokeless tobacco: Never   Substance Use Topics    Alcohol use: Yes     Alcohol/week: 4.0 standard drinks     Types: 4 Glasses of wine per week    Drug use: Not Currently     Review of  Systems    Physical Exam     Initial Vitals [04/08/23 1323]   BP Pulse Resp Temp SpO2   (!) 228/107 76 20 97.7 °F (36.5 °C) 98 %      MAP       --         Physical Exam    Nursing note and vitals reviewed.  Constitutional: She appears well-developed and well-nourished. She is not diaphoretic. No distress.   Well-appearing.  Speaking full sentences.  No acute distress.   HENT:   Head: Normocephalic and atraumatic.   Right Ear: External ear normal.   Left Ear: External ear normal.   Neck: Neck supple.   Cardiovascular:  Normal rate, regular rhythm, normal heart sounds and intact distal pulses.           Pulmonary/Chest: Breath sounds normal. No respiratory distress. She has no wheezes. She has no rhonchi. She has no rales.   Abdominal: Abdomen is soft. She exhibits no distension. There is no abdominal tenderness.   No abdominal TTP. There is no rebound and no guarding.   Genitourinary: Rectum:      Guaiac result positive.   Guaiac positive stool. : Acceptable.   Genitourinary Comments: Grossly positive CANDIS.     Musculoskeletal:      Cervical back: Neck supple.     Neurological: She is alert and oriented to person, place, and time. GCS score is 15. GCS eye subscore is 4. GCS verbal subscore is 5. GCS motor subscore is 6.   Skin: Skin is warm. Capillary refill takes less than 2 seconds. No rash noted.   Psychiatric: She has a normal mood and affect.       ED Course   Procedures  Labs Reviewed   HIV 1 / 2 ANTIBODY    Narrative:     Release to patient->Immediate   HEPATITIS C ANTIBODY    Narrative:     Release to patient->Immediate   CBC W/ AUTO DIFFERENTIAL    Narrative:     Release to patient->Immediate   COMPREHENSIVE METABOLIC PANEL    Narrative:     Release to patient->Immediate   TYPE & SCREEN          Imaging Results    None          Medications - No data to display  Medical Decision Making:   History:   Old Medical Records: I decided to obtain old medical records.  Old Records Summarized: records  from clinic visits.  Initial Assessment:   Emergent evaluation of painless rectal bleeding.  She is afebrile and hemodynamically stable.  Differential Diagnosis:   Lower GI bleed: internal hemorrhoids vs less likely diverticulosis, unlikely symptomatic anemia  Clinical Tests:   Lab Tests: Ordered and Reviewed  ED Management:  All labs are within normal limits including H/H.  She does not require emergent transfusion.  Relayed results to patient and counseled that given her age and complaint that observation is recommended.  She feels as though her presentation is most likely secondary to internal hemorrhoids as she has been told this in the past by her GI physician, and prefers to be discharged home.  She opted to leave AMA, and was of full decision-making capacity.  She was given strict ED return precautions, and she and her  expressed understanding.                        Clinical Impression:   Final diagnoses:  [K62.5] Rectal bleeding (Primary)  [Z53.29] Left against medical advice        ED Disposition Condition    AMA Stable                Damián Villarreal MD  Resident  04/08/23 2058

## 2023-04-08 NOTE — ED TRIAGE NOTES
"Patient ambulatory to ED from home with complaints of x1 episode of bright red rectal bleeding approx 1/2 cup x1 hour PTA. Patient denies headache, dizziness, shortness of breath, chest pain, abd pain, gu or any other medical complaints. States "other than the blood in my stool, I feel fine!" AAOx4. Respirations even and unlabored. Skin warm and dry. Pending ED workup at this time.    "

## 2023-04-08 NOTE — ED NOTES
Patient ambulatory to restroom with steady gait noted. Returned to ED stretcher without incident.

## 2023-04-08 NOTE — ED NOTES
Patient requesting to leave AMA. MD at bedside discussing risks - verbalized understanding. Teaching and education discussed with patient and  and instructed to return to ED if symptoms worsen. AMA forms signed

## 2023-05-19 ENCOUNTER — PES CALL (OUTPATIENT)
Dept: ADMINISTRATIVE | Facility: CLINIC | Age: 83
End: 2023-05-19
Payer: MEDICARE

## 2023-06-04 ENCOUNTER — NURSE TRIAGE (OUTPATIENT)
Dept: ADMINISTRATIVE | Facility: CLINIC | Age: 83
End: 2023-06-04
Payer: MEDICARE

## 2023-06-04 ENCOUNTER — HOSPITAL ENCOUNTER (EMERGENCY)
Facility: HOSPITAL | Age: 83
Discharge: HOME | End: 2023-06-04
Attending: STUDENT IN AN ORGANIZED HEALTH CARE EDUCATION/TRAINING PROGRAM
Payer: MEDICARE

## 2023-06-04 VITALS
DIASTOLIC BLOOD PRESSURE: 88 MMHG | WEIGHT: 145 LBS | HEART RATE: 79 BPM | SYSTOLIC BLOOD PRESSURE: 184 MMHG | OXYGEN SATURATION: 96 % | TEMPERATURE: 98 F | BODY MASS INDEX: 29.29 KG/M2 | RESPIRATION RATE: 16 BRPM
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
## 2023-06-04 DIAGNOSIS — M54.9 BACK PAIN: ICD-10-CM

## 2023-06-04 DIAGNOSIS — S22.000A COMPRESSION FRACTURE OF THORACIC VERTEBRA, CLOSED, INITIAL ENCOUNTER: Primary | ICD-10-CM

## 2023-06-04 PROBLEM — S22.070A COMPRESSION FRACTURE OF T9 VERTEBRA: Status: ACTIVE | Noted: 2023-06-04

## 2023-06-04 PROCEDURE — 99283 PR EMERGENCY DEPT VISIT,LEVEL III: ICD-10-PCS | Mod: ,,, | Performed by: PHYSICIAN ASSISTANT

## 2023-06-04 PROCEDURE — 99284 EMERGENCY DEPT VISIT MOD MDM: CPT | Mod: 25

## 2023-06-04 PROCEDURE — 99283 EMERGENCY DEPT VISIT LOW MDM: CPT | Mod: ,,, | Performed by: PHYSICIAN ASSISTANT

## 2023-06-04 PROCEDURE — 25000003 PHARM REV CODE 250: Performed by: PHYSICIAN ASSISTANT

## 2023-06-04 RX ORDER — LIDOCAINE 50 MG/G
1 PATCH TOPICAL DAILY
Qty: 15 PATCH | Refills: 0 | Status: SHIPPED | OUTPATIENT
Start: 2023-06-04 | End: 2023-11-03

## 2023-06-04 RX ORDER — LIDOCAINE 50 MG/G
1 PATCH TOPICAL
Status: DISCONTINUED | OUTPATIENT
Start: 2023-06-04 | End: 2023-06-04 | Stop reason: HOSPADM

## 2023-06-04 RX ORDER — ACETAMINOPHEN 500 MG
500 TABLET ORAL
Status: COMPLETED | OUTPATIENT
Start: 2023-06-04 | End: 2023-06-04

## 2023-06-04 RX ORDER — CYCLOBENZAPRINE HCL 5 MG
5 TABLET ORAL 2 TIMES DAILY PRN
Qty: 10 TABLET | Refills: 0 | Status: SHIPPED | OUTPATIENT
Start: 2023-06-04 | End: 2023-06-07 | Stop reason: SDUPTHER

## 2023-06-04 RX ADMIN — ACETAMINOPHEN 500 MG: 500 TABLET ORAL at 01:06

## 2023-06-04 RX ADMIN — LIDOCAINE 1 PATCH: 50 PATCH TOPICAL at 01:06

## 2023-06-04 NOTE — ASSESSMENT & PLAN NOTE
Dorothy Mantilla is a 83 y.o. female with T8 and T9 compression fractures. Full strength and sensation throughout. No pain upon palpation. Able to ambulate without assistive devices.     - WBAT BLE  - OTC pain medcations PRN  - Will defer intranasal calcitonin as pain is minimal at this time and will be of little benefit  - Able to ambulate in ED, will defer TLSO at this time  - Follow up in spine clinic in one week, follow up in fragility fracture clinic in one week, patient will be contacted with appointment details

## 2023-06-04 NOTE — HPI
Dorothy Mantilla is a 83 y.o. 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 She reports she was doing yard work when she fell onto her back while holding a rope. Patient denies any head trauma or LOC. The patient denies prior hx of falls. Patient denies numbness and tingling. Denies any other musculoskeletal pain or injuries. No known history of prior spine injury or surgery.  Patient denies bowel or bladder incontinence, saddle anesthesia, or muscle weakness. Patient also denies difficulty with keys, buttons, hand writing, or small items.  Walks w/out assisted devices at baseline. Doesn't take any anticoagulation at baseline.   They deny IV drug use.   They deny tobacco use.   They deny alcohol use.   They deny immunosuppressant medications.  They deny chemotherapy.  They deny radiation therapy.   She walks 2-3 miles daily.

## 2023-06-04 NOTE — ED PROVIDER NOTES
Encounter Date: 2023       History     Chief Complaint   Patient presents with    Back Pain     Today Was assisting in cutting a tree limb holding rope, when it released she fell backward onto back in grass. Now having back pain, stiffness to R upper back. Ambulatory     84 y/o F with history of GERD presents to the ED c/o back pain after a fall. Around 7a - she was holding a rope attached to a tree limb that was being cut down, when the tree limb was cut free, she fell backwards into the grass landing on her back. Denies any head trauma or LOC.  She was able to continuing working until they were finished around 10:30a. She reports 7/10 pain to the thoracic back, took 500mg tylenol with minimal relief. Pain seems to be worse with lying flat and improved with walking. She is not on any blood thinners. Denies f/c, chest pain, SOB, abdominal pain, nausea, confusion, numbness, paresthesias.     The history is provided by the patient.   Review of patient's allergies indicates:   Allergen Reactions    Codeine Hives and Edema    Latex, natural rubber Hives    Ciprofloxacin Diarrhea and Nausea And Vomiting     Past Medical History:   Diagnosis Date    Bradycardia     GERD (gastroesophageal reflux disease)     Hyperlipidemia      Past Surgical History:   Procedure Laterality Date    ADENOIDECTOMY      APPENDECTOMY      BRAIN SURGERY Left     lumpectomy    BUNIONECTOMY Bilateral     COSMETIC SURGERY      INSERTION OF PACEMAKER      JOINT REPLACEMENT Bilateral     knee replacements    SURGICAL REMOVAL OF EXOSTOSIS OF TOE      one toe on each foot    TONSILLECTOMY       Family History   Problem Relation Age of Onset    Hypertension Mother          at age 99    Diabetes Mother     Parkinsonism Father     Hypertension Father     Colon cancer Father     Cancer Daughter         skin    Basal cell carcinoma Son     Stroke Paternal Grandfather     Hypertension Paternal Grandfather      Social History     Tobacco Use     Smoking status: Never    Smokeless tobacco: Never   Substance Use Topics    Alcohol use: Yes     Alcohol/week: 4.0 standard drinks     Types: 4 Glasses of wine per week    Drug use: Not Currently     Review of Systems   Constitutional:  Negative for chills and fever.   Eyes:  Negative for visual disturbance.   Respiratory:  Negative for shortness of breath.    Cardiovascular:  Negative for chest pain.   Gastrointestinal:  Negative for abdominal pain, nausea and vomiting.   Genitourinary:  Negative for dysuria and hematuria.   Musculoskeletal:  Positive for back pain. Negative for gait problem.   Skin:  Negative for rash.   Neurological:  Negative for seizures, syncope, weakness, light-headedness, numbness and headaches.   Psychiatric/Behavioral:  Negative for confusion.      Physical Exam     Initial Vitals [06/04/23 1245]   BP Pulse Resp Temp SpO2   (!) 197/95 80 18 97.8 °F (36.6 °C) 100 %      MAP       --         Physical Exam    Nursing note and vitals reviewed.  Constitutional: She appears well-developed and well-nourished. She is not diaphoretic. No distress.   Neck: Neck supple.   Normal range of motion.  Cardiovascular:  Normal rate, regular rhythm and normal heart sounds.     Exam reveals no gallop and no friction rub.       No murmur heard.  Pulmonary/Chest: Breath sounds normal. She has no wheezes. She has no rhonchi. She has no rales.   Abdominal: Abdomen is soft. Bowel sounds are normal. There is no abdominal tenderness. There is no rebound and no guarding.   Musculoskeletal:      Cervical back: Normal range of motion and neck supple.      Comments: No midline C, T or L spine tenderness. Tenderness noted to the R posterior ribs. No bruising or abrasions.      Neurological: She is alert and oriented to person, place, and time.   Skin: Skin is warm and dry.   Psychiatric: She has a normal mood and affect.       ED Course   Procedures  Labs Reviewed - No data to display       Imaging Results                CT Thoracic Spine Without Contrast (Final result)  Result time 06/04/23 15:45:54      Final result by Blade Bess MD (06/04/23 15:45:54)                   Impression:      1. Generalized osteopenia with suspected acute compression fractures involving T9 and T10 vertebral bodies with minimal osseous retropulsion resulting in minimal acquired canal stenosis at T10 level.  2. No evidence of acute compression fracture of the lumbar spine.  3. Overall minimal thoracic spondylosis.  Lumbar spondylosis as above, most prominent at L2-3 level resulting in mild acquired canal stenosis and severe right neural foraminal narrowing.  4. Right upper lobe several small solid pulmonary micronodules measuring up to 4-5 mm.  For multiple solid nodules all <6 mm, Fleischner Society 2017 guidelines recommend no routine follow up for a low risk patient, or follow up with non-contrast chest CT at 12 months after discovery in a high risk patient.  5. Additional findings of colonic diverticulosis, atherosclerosis, small hiatal hernia and pulmonary mosaic attenuation which can be seen with small vessels disease including pulmonary edema or small airways process.  This report was flagged in Epic as abnormal.  This report was flagged in Epic as containing an incidental finding.      Electronically signed by: Blade Bess MD  Date:    06/04/2023  Time:    15:45               Narrative:    EXAMINATION:  CT LUMBAR SPINE WITHOUT CONTRAST; CT THORACIC SPINE WITHOUT CONTRAST    CLINICAL HISTORY:  Low back pain, trauma;Back trauma, no prior imaging (Age >= 16y);; Back trauma, no prior imaging (Age >= 16y);    TECHNIQUE:  Low-dose axial, sagittal and coronal reformations are obtained through the thoracic and also lumbar spine.  Contrast was not administered.    COMPARISON:  None.    FINDINGS:  Thoracic spine: Generalized osteopenia.  Levocurvature.  Slight exaggeration of the thoracic kyphosis.  Minimal anterior wedge deformity of T9.  Slight  cortical step-off with radiolucency and thin transverse band of increased density at the lower aspect of T9 vertebral body consistent with acute fracture.  Fracture planes do not appear to extend to the posterior aspect of the vertebral body and no evidence of osseous retropulsion into the canal at this level.  Minimal anterior wedge with superimposed minimal central compression deformity of the superior endplate of T10 with few subtle radiolucencies extending to the posterior cortex with minimal osseous retropulsion at this level resulting in minimal acquired canal stenosis.  No significant listhesis.  No dislocation or destructive osseous process.  Minimal interbody osseous fusion along the anterior aspect at T6-7 and T7-8 levels.  Multilevel slight loss of disc height with minimal endplate changes, small marginal osteophytes and facet arthrosis of the thoracic spine resulting in at most minimal bilateral neural foraminal narrowing.  No significant spinal canal stenosis at any level.  There is minimal adjacent prevertebral and paravertebral soft tissue swelling about the T9 and suspected T10 fracture sites.  No paraspinal mass, large hematoma or fluid collection seen.  No subcutaneous emphysema or radiodense retained foreign body.  Mild fatty atrophy of the paraspinal muscles.    Lumbar spine: Generalized osteopenia.  Levocurvature.  Lumbar lordosis is maintained.  Five non-rib-bearing lumbar type vertebral bodies.  Lumbar vertebral body heights appear relatively maintained without evidence of acute compression fracture.  There is degenerative related minimal grade 1 retrolisthesis of L2 on 3 and degenerative related 5 mm grade 1 anterolisthesis of L5 on S1.  No pars defect seen.  No displaced fracture, dislocation or destructive osseous process.  No prevertebral soft tissue thickening.  No paraspinal mass or fluid collection.  No subcutaneous emphysema or radiodense retained foreign body.  Multilevel degenerative  disc disease with loss of disc height, endplate changes, small marginal osteophytes, vacuum phenomenon and facet arthrosis.  Mild fatty atrophy of the paraspinal muscles.    T12-L1: Posterior disc osteophyte complex resulting in mild acquired canal stenosis.  Minimal left neural foraminal narrowing.  No significant right neural foraminal narrowing.    L1-2: Posterior disc osteophyte complex resulting in mild acquired canal stenosis.  Moderate right and mild left neural foraminal narrowing.    L2-3: Posterior disc osteophyte complex resulting in mild acquired canal stenosis.  Severe right neural foraminal narrowing.  No significant left neural foraminal narrowing.    L3-4: Posterior disc osteophyte complex resulting in mild acquired canal stenosis.  Moderate right and mild left neural foraminal narrowing.    L4-5: Posterior disc osteophyte complex resulting in minimal acquired canal stenosis.  Mild bilateral neural foraminal narrowing, left more than right.    L5-S1: No significant spinal canal stenosis.  Mild bilateral neural foraminal narrowing, left more than right.    Miscellaneous: Scattered atherosclerotic vascular calcifications.  Biapical pleuroparenchymal scarring.  Scattered linear opacities throughout the imaged lungs consistent with platelike scarring versus atelectasis.  Mild patchy nonspecific pulmonary mosaic attenuation throughout the imaged lungs which can be seen with small vessels disease including pulmonary edema or small airways process.  There are several small solid pulmonary nodules within the peripheral aspect of the right upper lobe measuring up to 4-5 mm.  Esophagus is mildly patulous along its course.  Small hiatal hernia.  Numerous scattered colonic diverticula.  Suspected remote cholecystectomy.                                        CT Lumbar Spine Without Contrast (Final result)  Result time 06/04/23 15:45:54      Final result by Blade Bess MD (06/04/23 15:45:54)                    Impression:      1. Generalized osteopenia with suspected acute compression fractures involving T9 and T10 vertebral bodies with minimal osseous retropulsion resulting in minimal acquired canal stenosis at T10 level.  2. No evidence of acute compression fracture of the lumbar spine.  3. Overall minimal thoracic spondylosis.  Lumbar spondylosis as above, most prominent at L2-3 level resulting in mild acquired canal stenosis and severe right neural foraminal narrowing.  4. Right upper lobe several small solid pulmonary micronodules measuring up to 4-5 mm.  For multiple solid nodules all <6 mm, Fleischner Society 2017 guidelines recommend no routine follow up for a low risk patient, or follow up with non-contrast chest CT at 12 months after discovery in a high risk patient.  5. Additional findings of colonic diverticulosis, atherosclerosis, small hiatal hernia and pulmonary mosaic attenuation which can be seen with small vessels disease including pulmonary edema or small airways process.  This report was flagged in Epic as abnormal.  This report was flagged in Epic as containing an incidental finding.      Electronically signed by: Blade Bess MD  Date:    06/04/2023  Time:    15:45               Narrative:    EXAMINATION:  CT LUMBAR SPINE WITHOUT CONTRAST; CT THORACIC SPINE WITHOUT CONTRAST    CLINICAL HISTORY:  Low back pain, trauma;Back trauma, no prior imaging (Age >= 16y);; Back trauma, no prior imaging (Age >= 16y);    TECHNIQUE:  Low-dose axial, sagittal and coronal reformations are obtained through the thoracic and also lumbar spine.  Contrast was not administered.    COMPARISON:  None.    FINDINGS:  Thoracic spine: Generalized osteopenia.  Levocurvature.  Slight exaggeration of the thoracic kyphosis.  Minimal anterior wedge deformity of T9.  Slight cortical step-off with radiolucency and thin transverse band of increased density at the lower aspect of T9 vertebral body consistent with acute fracture.   Fracture planes do not appear to extend to the posterior aspect of the vertebral body and no evidence of osseous retropulsion into the canal at this level.  Minimal anterior wedge with superimposed minimal central compression deformity of the superior endplate of T10 with few subtle radiolucencies extending to the posterior cortex with minimal osseous retropulsion at this level resulting in minimal acquired canal stenosis.  No significant listhesis.  No dislocation or destructive osseous process.  Minimal interbody osseous fusion along the anterior aspect at T6-7 and T7-8 levels.  Multilevel slight loss of disc height with minimal endplate changes, small marginal osteophytes and facet arthrosis of the thoracic spine resulting in at most minimal bilateral neural foraminal narrowing.  No significant spinal canal stenosis at any level.  There is minimal adjacent prevertebral and paravertebral soft tissue swelling about the T9 and suspected T10 fracture sites.  No paraspinal mass, large hematoma or fluid collection seen.  No subcutaneous emphysema or radiodense retained foreign body.  Mild fatty atrophy of the paraspinal muscles.    Lumbar spine: Generalized osteopenia.  Levocurvature.  Lumbar lordosis is maintained.  Five non-rib-bearing lumbar type vertebral bodies.  Lumbar vertebral body heights appear relatively maintained without evidence of acute compression fracture.  There is degenerative related minimal grade 1 retrolisthesis of L2 on 3 and degenerative related 5 mm grade 1 anterolisthesis of L5 on S1.  No pars defect seen.  No displaced fracture, dislocation or destructive osseous process.  No prevertebral soft tissue thickening.  No paraspinal mass or fluid collection.  No subcutaneous emphysema or radiodense retained foreign body.  Multilevel degenerative disc disease with loss of disc height, endplate changes, small marginal osteophytes, vacuum phenomenon and facet arthrosis.  Mild fatty atrophy of the  paraspinal muscles.    T12-L1: Posterior disc osteophyte complex resulting in mild acquired canal stenosis.  Minimal left neural foraminal narrowing.  No significant right neural foraminal narrowing.    L1-2: Posterior disc osteophyte complex resulting in mild acquired canal stenosis.  Moderate right and mild left neural foraminal narrowing.    L2-3: Posterior disc osteophyte complex resulting in mild acquired canal stenosis.  Severe right neural foraminal narrowing.  No significant left neural foraminal narrowing.    L3-4: Posterior disc osteophyte complex resulting in mild acquired canal stenosis.  Moderate right and mild left neural foraminal narrowing.    L4-5: Posterior disc osteophyte complex resulting in minimal acquired canal stenosis.  Mild bilateral neural foraminal narrowing, left more than right.    L5-S1: No significant spinal canal stenosis.  Mild bilateral neural foraminal narrowing, left more than right.    Miscellaneous: Scattered atherosclerotic vascular calcifications.  Biapical pleuroparenchymal scarring.  Scattered linear opacities throughout the imaged lungs consistent with platelike scarring versus atelectasis.  Mild patchy nonspecific pulmonary mosaic attenuation throughout the imaged lungs which can be seen with small vessels disease including pulmonary edema or small airways process.  There are several small solid pulmonary nodules within the peripheral aspect of the right upper lobe measuring up to 4-5 mm.  Esophagus is mildly patulous along its course.  Small hiatal hernia.  Numerous scattered colonic diverticula.  Suspected remote cholecystectomy.                                       Medications   acetaminophen tablet 500 mg (500 mg Oral Given 6/4/23 9390)     Medical Decision Making:   History:   Old Medical Records: I decided to obtain old medical records.  Clinical Tests:   Radiological Study: Ordered and Reviewed  Other:   I have discussed this case with another health care  provider.       <> Summary of the Discussion: Orthopedics      APC / Resident Notes:   84 y/o F with history of GERD presents to the ED c/o back pain after a fall. Hypertensive - does not take BP medication at home, this may be secondary to pain. RRR. Lungs clear. Abdomen soft, nontender. There is no midline C, T or L spine tenderness. Tenderness to the R posterior ribs. No bruising/abrasions. DDx includes but is not limited to MSK pain, fracture, dislocation. Will get CT T and L spine.    CT T and L spine shows acute compression fractures to T9 and T10.    Discussed with orthopedics (they are on for spine call) and they evaluated in the ED. No brace needed. Pain control and follow up in clinic.     I do not feel that she needs any further labs or imaging at this time. Stable for discharge.     She was discharged with prescription for lidoderm patches and flexeril (at patient's request - states this works best for her back pain).  Recommend OTC tylenol.  She will follow up with orthopedics.  Strict ED return precautions given.  All of the patient's questions were answered.  I reviewed the patient's chart and imaging and discussed the case with my supervising physician.                        Clinical Impression:   Final diagnoses:  [M54.9] Back pain  [W19.XXXA] Fall  [S22.000A] Compression fracture of thoracic vertebra, closed, initial encounter (Primary)        ED Disposition Condition    Discharge Stable          ED Prescriptions       Medication Sig Dispense Start Date End Date Auth. Provider    LIDOcaine (LIDODERM) 5 % Place 1 patch onto the skin once daily. Remove & Discard patch within 12 hours or as directed by MD 15 patch 6/4/2023 -- Libia Alvarez PA-C    cyclobenzaprine (FLEXERIL) 5 MG tablet Take 1 tablet (5 mg total) by mouth 2 (two) times daily as needed for Muscle spasms. 10 tablet 6/4/2023 6/14/2023 Libia Alvarez PA-C          Follow-up Information       Follow up With Specialties Details Why Contact  Info Additional Information    [facility] [address] [address] [facility]   [address]  [facility] [address] [phone] [facility] - Main Building, 5th Floor Please park in [facility] and take [facility] elevator             Libia Alvarez PA-C  06/04/23 3506

## 2023-06-04 NOTE — CONSULTS
[facility] Emergency Dept  Orthopedics  Consult Note    Patient Name: Dorothy Mantilla  MRN: [id number]  Admission Date: 6/4/2023  Hospital Length of Stay: 0 days  Attending Provider: No att. providers found  Primary Care Provider: Opal Zavala MD    Inpatient consult to Orthopedic Surgery  Consult performed by: Jj Young MD  Consult ordered by: Libia Alvarez PA-C        Subjective:     Principal Problem:Compression fracture of T9 vertebra    Chief Complaint:   Chief Complaint   Patient presents with    Back Pain     Today Was assisting in cutting a tree limb holding rope, when it released she fell backward onto back in grass. Now having back pain, stiffness to R upper back. Ambulatory        HPI: Dorothy Mantilla is a 83 y.o. 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 She reports she was doing yard work when she fell onto her back while holding a rope. Patient denies any head trauma or LOC. The patient denies prior hx of falls. Patient denies numbness and tingling. Denies any other musculoskeletal pain or injuries. No known history of prior spine injury or surgery.  Patient denies bowel or bladder incontinence, saddle anesthesia, or muscle weakness. Patient also denies difficulty with keys, buttons, hand writing, or small items.  Walks w/out assisted devices at baseline. Doesn't take any anticoagulation at baseline.   They deny IV drug use.   They deny tobacco use.   They deny alcohol use.   They deny immunosuppressant medications.  They deny chemotherapy.  They deny radiation therapy.   She walks 2-3 miles daily.          Past Medical History:   Diagnosis Date    Bradycardia     GERD (gastroesophageal reflux disease)     Hyperlipidemia        Past Surgical History:   Procedure Laterality Date    ADENOIDECTOMY      APPENDECTOMY      BRAIN SURGERY Left     lumpectomy    BUNIONECTOMY Bilateral     COSMETIC SURGERY      INSERTION  OF PACEMAKER      JOINT REPLACEMENT Bilateral     knee replacements    SURGICAL REMOVAL OF EXOSTOSIS OF TOE      one toe on each foot    TONSILLECTOMY         Review of patient's allergies indicates:   Allergen Reactions    Codeine Hives and Edema    Latex, natural rubber Hives    Ciprofloxacin Diarrhea and Nausea And Vomiting       Current Facility-Administered Medications   Medication    LIDOcaine 5 % patch 1 patch     Current Outpatient Medications   Medication Sig    aspirin (BUFFERIN) 325 MG Tab Take 325 mg by mouth once daily. 2 tab    esomeprazole (NEXIUM) 20 MG capsule Take 20 mg by mouth before breakfast.    cholecalciferol, vitamin D3, (VITAMIN D3) 25 mcg (1,000 unit) capsule Take 1,000 Units by mouth 2 (two) times daily.    cyclobenzaprine (FLEXERIL) 5 MG tablet Take 1 tablet (5 mg total) by mouth 2 (two) times daily as needed for Muscle spasms.    LIDOcaine (LIDODERM) 5 % Place 1 patch onto the skin once daily. Remove & Discard patch within 12 hours or as directed by MD    naproxen sodium (ANAPROX) 220 MG tablet Take 220 mg by mouth 2 (two) times daily with meals. 2 tab     Family History       Problem Relation (Age of Onset)    Basal cell carcinoma Son    Cancer Daughter    Colon cancer Father    Diabetes Mother    Hypertension Mother, Father, Paternal Grandfather    Parkinsonism Father    Stroke Paternal Grandfather          Tobacco Use    Smoking status: Never    Smokeless tobacco: Never   Substance and Sexual Activity    Alcohol use: Yes     Alcohol/week: 4.0 standard drinks     Types: 4 Glasses of wine per week    Drug use: Not Currently    Sexual activity: Yes     Partners: Male     ROS  Constitutional: negative for fevers or chills  Eyes: negative visual changes or eye discharge  ENT: negative for ear pain or sore throat  Respiratory: negative for shortness of breath or cough  Cardiovascular: negative for chest pain or palpitations  Gastrointestinal: negative for abdominal pain,  nausea, or vomiting  Genitourinary: negative for dysuria and flank pain  Neurological: negative for headaches or dizziness  Musculoskeletal: positive for back pain    Objective:     Vital Signs (Most Recent):  Temp: 97.6 °F (36.4 °C) (06/04/23 1454)  Pulse: 79 (06/04/23 1556)  Resp: 16 (06/04/23 1556)  BP: (!) 184/88 (06/04/23 1556)  SpO2: 96 % (06/04/23 1556) Vital Signs (24h Range):  Temp:  [97.6 °F (36.4 °C)-97.8 °F (36.6 °C)] 97.6 °F (36.4 °C)  Pulse:  [66-80] 79  Resp:  [16-18] 16  SpO2:  [96 %-100 %] 96 %  BP: (184-217)/(86-95) 184/88     Weight: 65.8 kg (145 lb)     Body mass index is 29.29 kg/m².    No intake or output data in the 24 hours ending 06/04/23 1637     Ortho/SPM Exam     Awake/alert/oriented x3, No acute distress, Afebrile, Vital signs stable  Normocephalic, Atraumatic  Good inspiratory effort with unlaboured breathing    No TTP throughout the C/T/L spine    Motor            RIGHT  LEFT  Elbow Flexion (C5)                           5/5               5/5  Wrist Extension (C6)       5/5    5/5   Elbow Extension (C7)                            5/5               5/5     Finger Flexion (C8)                            5/5                 5/5  Finger Abduction (T1)                            5/5                 5/5     Hip Flexion (L2)                                     5/5                  5/5  Knee Extension (L3)                              5/5                  5/5  Ankle dorsiflexion (L4)                           5/5                  5/5       EHL (L5)                                                5/5                  5/5  Ankle plantar flexion (S1)                      5/5                  5/5            Sensation   Sensation (a=absent, i=impaired, n=normal)         RIGHT  LEFT  C5 dermatome       n     n  C6 dermatome       n     n  C7 dermatome       n     n  C8 dermatome       n     n  T1 dermatome        n     n    L2 dermatome          n     n  L3 dermatome          n     n  L4 dermatome           n     n  L5 dermatome        n     n  S1 dermatome            n     n  S2 dermatome            n     n    Reflexes       RIGHT  LEFT  Biceps           2+     2+  Patella        2+     2+  Achilles       2+     2+  Hoffmans's        Neg    Neg  Babinski      Neg    Neg       Pulses       RIGHT  LEFT  Radial        2+     2+  Dorsalis Pedis       2+     2+        Significant Labs: All pertinent labs within the past 24 hours have been reviewed.    Significant Imaging: CT: I have reviewed all pertinent results/findings and my personal findings are:  T8 and T9 compression fractures with minimal height loss, grade one L5 spondylolishtesis    Assessment/Plan:     * Compression fracture of T8 and T9 vertebra  Dorothy Mantilla is a 83 y.o. female with T8 and T9 compression fractures. Full strength and sensation throughout. No pain upon palpation. Able to ambulate without assistive devices.     - WBAT BLE  - OTC pain medcations PRN  - Will defer intranasal calcitonin as pain is minimal at this time and will be of little benefit  - Able to ambulate in ED, will defer TLSO at this time  - Follow up in spine clinic in one week, follow up in fragility fracture clinic in one week, patient will be contacted with appointment details          Jj Young MD  Orthopedics  [facility] - Emergency Dept

## 2023-06-04 NOTE — SUBJECTIVE & OBJECTIVE
Past Medical History:   Diagnosis Date    Bradycardia     GERD (gastroesophageal reflux disease)     Hyperlipidemia        Past Surgical History:   Procedure Laterality Date    ADENOIDECTOMY      APPENDECTOMY      BRAIN SURGERY Left     lumpectomy    BUNIONECTOMY Bilateral     COSMETIC SURGERY      INSERTION OF PACEMAKER      JOINT REPLACEMENT Bilateral     knee replacements    SURGICAL REMOVAL OF EXOSTOSIS OF TOE      one toe on each foot    TONSILLECTOMY         Review of patient's allergies indicates:   Allergen Reactions    Codeine Hives and Edema    Latex, natural rubber Hives    Ciprofloxacin Diarrhea and Nausea And Vomiting       Current Facility-Administered Medications   Medication    LIDOcaine 5 % patch 1 patch     Current Outpatient Medications   Medication Sig    aspirin (BUFFERIN) 325 MG Tab Take 325 mg by mouth once daily. 2 tab    esomeprazole (NEXIUM) 20 MG capsule Take 20 mg by mouth before breakfast.    cholecalciferol, vitamin D3, (VITAMIN D3) 25 mcg (1,000 unit) capsule Take 1,000 Units by mouth 2 (two) times daily.    cyclobenzaprine (FLEXERIL) 5 MG tablet Take 1 tablet (5 mg total) by mouth 2 (two) times daily as needed for Muscle spasms.    LIDOcaine (LIDODERM) 5 % Place 1 patch onto the skin once daily. Remove & Discard patch within 12 hours or as directed by MD    naproxen sodium (ANAPROX) 220 MG tablet Take 220 mg by mouth 2 (two) times daily with meals. 2 tab     Family History       Problem Relation (Age of Onset)    Basal cell carcinoma Son    Cancer Daughter    Colon cancer Father    Diabetes Mother    Hypertension Mother, Father, Paternal Grandfather    Parkinsonism Father    Stroke Paternal Grandfather          Tobacco Use    Smoking status: Never    Smokeless tobacco: Never   Substance and Sexual Activity    Alcohol use: Yes     Alcohol/week: 4.0 standard drinks     Types: 4 Glasses of wine per week    Drug use: Not Currently    Sexual activity: Yes     Partners: Male      ROS  Constitutional: negative for fevers or chills  Eyes: negative visual changes or eye discharge  ENT: negative for ear pain or sore throat  Respiratory: negative for shortness of breath or cough  Cardiovascular: negative for chest pain or palpitations  Gastrointestinal: negative for abdominal pain, nausea, or vomiting  Genitourinary: negative for dysuria and flank pain  Neurological: negative for headaches or dizziness  Musculoskeletal: positive for back pain    Objective:     Vital Signs (Most Recent):  Temp: 97.6 °F (36.4 °C) (06/04/23 1454)  Pulse: 79 (06/04/23 1556)  Resp: 16 (06/04/23 1556)  BP: (!) 184/88 (06/04/23 1556)  SpO2: 96 % (06/04/23 1556) Vital Signs (24h Range):  Temp:  [97.6 °F (36.4 °C)-97.8 °F (36.6 °C)] 97.6 °F (36.4 °C)  Pulse:  [66-80] 79  Resp:  [16-18] 16  SpO2:  [96 %-100 %] 96 %  BP: (184-217)/(86-95) 184/88     Weight: 65.8 kg (145 lb)     Body mass index is 29.29 kg/m².    No intake or output data in the 24 hours ending 06/04/23 1637     Ortho/SPM Exam     Awake/alert/oriented x3, No acute distress, Afebrile, Vital signs stable  Normocephalic, Atraumatic  Good inspiratory effort with unlaboured breathing    No TTP throughout the C/T/L spine    Motor            RIGHT  LEFT  Elbow Flexion (C5)                           5/5               5/5  Wrist Extension (C6)       5/5    5/5   Elbow Extension (C7)                            5/5               5/5     Finger Flexion (C8)                            5/5                 5/5  Finger Abduction (T1)                            5/5                 5/5     Hip Flexion (L2)                                     5/5                  5/5  Knee Extension (L3)                              5/5                  5/5  Ankle dorsiflexion (L4)                           5/5                  5/5       EHL (L5)                                                5/5                  5/5  Ankle plantar flexion (S1)                      5/5                   5/5            Sensation   Sensation (a=absent, i=impaired, n=normal)         RIGHT  LEFT  C5 dermatome       n     n  C6 dermatome       n     n  C7 dermatome       n     n  C8 dermatome       n     n  T1 dermatome        n     n    L2 dermatome          n     n  L3 dermatome          n     n  L4 dermatome          n     n  L5 dermatome        n     n  S1 dermatome            n     n  S2 dermatome            n     n    Reflexes       RIGHT  LEFT  Biceps           2+     2+  Patella        2+     2+  Achilles       2+     2+  Hoffmans's        Neg    Neg  Babinski      Neg    Neg       Pulses       RIGHT  LEFT  Radial        2+     2+  Dorsalis Pedis       2+     2+        Significant Labs: All pertinent labs within the past 24 hours have been reviewed.    Significant Imaging: CT: I have reviewed all pertinent results/findings and my personal findings are:  T8 and T9 compression fractures with minimal height loss, grade one L5 spondylolishtesis

## 2023-06-04 NOTE — TELEPHONE ENCOUNTER
Reason for Disposition   [1] SEVERE PAIN in kidney area (flank) AND [2] follows direct blow to that site    Additional Information   Negative: Major injury from dangerous force (e.g., fall > 10 feet or 3 meters)   Negative: [1] Major bleeding (e.g., actively dripping or spurting) AND [2] can't be stopped   Negative: Shock suspected (e.g., cold/pale/clammy skin, too weak to stand)   Negative: Difficult to awaken or acting confused (e.g., disoriented, slurred speech)   Negative: [1] SEVERE weakness (i.e., unable to walk or barely able to walk, requires support) AND [2] new-onset or worsening   Negative: [1] Can't stand (bear weight) or walk AND [2] new-onset after fall   Negative: Sounds like a life-threatening emergency to the triager   Negative: Fainted (passed out)   Negative: New-onset or worsening weakness of the face, arm or leg on one side of the body   Negative: [1] New-onset or worsening dizziness AND [2] described as spinning or off balance (i.e., vertigo)   Negative: [1] New-onset or worsening dizziness AND [2] NO spinning sensation or trouble with balance   Negative: New-onset or worsening pale skin (pallor)   Negative: Pregnant and fall   Negative: Dangerous mechanism of injury (e.g., MVA, contact sports, trampoline, diving, fall > 10 feet or 3 meters)  (Exception: Back pain began > 1 hour after injury.)   Negative: [1] Weakness (i.e., paralysis, loss of muscle strength) of the leg(s) or foot AND [2] sudden onset after back injury   Negative: [1] Numbness (i.e., loss of sensation) of the leg(s) or foot AND [2] sudden onset after back injury   Negative: [1] Major bleeding (e.g., actively dripping or spurting) AND [2] can't be stopped   Negative: Bullet wound, knife wound, or other penetrating object   Negative: Shock suspected (e.g., cold/pale/clammy skin, too weak to stand, low BP, rapid pulse)   Negative: Sounds like a life-threatening emergency to the triager   Negative: [1] Injuries at more than 1 site  AND [2] unsure which guideline to use   Negative: Injury to the neck   Negative: Injury to the tailbone   Negative: Back pain from overuse (work, exercise, gardening) OR from twisting, lifting, or bending injury   Negative: Back pain not from an injury    Protocols used: Falls and [id number]-[id number], Back Injury-A-AH  Spouse called stating pt fell and hit back on R side. Now has pain in back. pt was holding a rope to help with taking down tree limb then fell back to grass. Able to stand and walk. Pt admits to pain with deep breath. rating pain = 7-8. Rec ED. Caller agrees.

## 2023-06-04 NOTE — ED TRIAGE NOTES
Pt is a 83 yr old female Aox4 arrived to er with c/o right mid-lower back pain due to fall while assisting cutting down a tree limb. Pt reports she fell back onto grass. No bruising noted to area. Pt denies hitting head/LOC/blood thinners. Pt denies chest pain, sob, abd pain, headaches, n/v.

## 2023-06-06 NOTE — TELEPHONE ENCOUNTER
Called patient to schedule an appointment with  however she did not want the one offered . Patient wished to schedule an appointment with provider who treats her

## 2023-06-07 ENCOUNTER — HOSPITAL ENCOUNTER (OUTPATIENT)
Dept: RADIOLOGY | Facility: HOSPITAL | Age: 83
Discharge: HOME | End: 2023-06-07
Attending: ORTHOPAEDIC SURGERY
Payer: MEDICARE

## 2023-06-07 ENCOUNTER — TELEPHONE (OUTPATIENT)
Dept: ORTHOPEDICS | Facility: CLINIC | Age: 83
End: 2023-06-07
Payer: MEDICARE

## 2023-06-07 ENCOUNTER — OFFICE VISIT (OUTPATIENT)
Dept: PRIMARY CARE CLINIC | Facility: CLINIC | Age: 83
End: 2023-06-07
Payer: MEDICARE

## 2023-06-07 VITALS
SYSTOLIC BLOOD PRESSURE: 130 MMHG | DIASTOLIC BLOOD PRESSURE: 72 MMHG | BODY MASS INDEX: 29.16 KG/M2 | HEART RATE: 85 BPM | WEIGHT: 144.63 LBS | OXYGEN SATURATION: 95 % | HEIGHT: 59 IN | TEMPERATURE: 99 F

## 2023-06-07 DIAGNOSIS — S22.070S COMPRESSION FRACTURE OF T9 VERTEBRA, SEQUELA: Primary | ICD-10-CM

## 2023-06-07 DIAGNOSIS — M51.36 DDD (DEGENERATIVE DISC DISEASE), LUMBAR: ICD-10-CM

## 2023-06-07 DIAGNOSIS — M85.80 OSTEOPENIA, UNSPECIFIED LOCATION: ICD-10-CM

## 2023-06-07 DIAGNOSIS — M51.34 DDD (DEGENERATIVE DISC DISEASE), THORACIC: Primary | ICD-10-CM

## 2023-06-07 DIAGNOSIS — M51.34 DDD (DEGENERATIVE DISC DISEASE), THORACIC: ICD-10-CM

## 2023-06-07 DIAGNOSIS — H61.20 COMPLAINT OF WAX IN EAR: ICD-10-CM

## 2023-06-07 PROCEDURE — 99999 PR PBB SHADOW E&M-EST. PATIENT-LVL V: CPT | Mod: PBBFAC,,, | Performed by: STUDENT IN AN ORGANIZED HEALTH CARE EDUCATION/TRAINING PROGRAM

## 2023-06-07 PROCEDURE — 99214 OFFICE O/P EST MOD 30 MIN: CPT | Mod: S$PBB,,, | Performed by: STUDENT IN AN ORGANIZED HEALTH CARE EDUCATION/TRAINING PROGRAM

## 2023-06-07 PROCEDURE — 72070 X-RAY EXAM THORAC SPINE 2VWS: CPT | Mod: 26,,, | Performed by: RADIOLOGY

## 2023-06-07 PROCEDURE — 72100 XR LUMBAR SPINE AP AND LATERAL: ICD-10-PCS | Mod: 26,,, | Performed by: RADIOLOGY

## 2023-06-07 PROCEDURE — 72100 X-RAY EXAM L-S SPINE 2/3 VWS: CPT | Mod: TC,PN

## 2023-06-07 PROCEDURE — 99999 PR PBB SHADOW E&M-EST. PATIENT-LVL V: ICD-10-PCS | Mod: PBBFAC,,, | Performed by: STUDENT IN AN ORGANIZED HEALTH CARE EDUCATION/TRAINING PROGRAM

## 2023-06-07 PROCEDURE — 99215 OFFICE O/P EST HI 40 MIN: CPT | Mod: PBBFAC,PN | Performed by: STUDENT IN AN ORGANIZED HEALTH CARE EDUCATION/TRAINING PROGRAM

## 2023-06-07 PROCEDURE — 72100 X-RAY EXAM L-S SPINE 2/3 VWS: CPT | Mod: 26,,, | Performed by: RADIOLOGY

## 2023-06-07 PROCEDURE — 72070 XR THORACIC SPINE AP LATERAL: ICD-10-PCS | Mod: 26,,, | Performed by: RADIOLOGY

## 2023-06-07 PROCEDURE — 99214 PR OFFICE/OUTPT VISIT, EST, LEVL IV, 30-39 MIN: ICD-10-PCS | Mod: S$PBB,,, | Performed by: STUDENT IN AN ORGANIZED HEALTH CARE EDUCATION/TRAINING PROGRAM

## 2023-06-07 PROCEDURE — 72070 X-RAY EXAM THORAC SPINE 2VWS: CPT | Mod: TC,PN

## 2023-06-07 RX ORDER — OXYCODONE HYDROCHLORIDE 5 MG/1
5 TABLET ORAL EVERY 4 HOURS PRN
Qty: 30 TABLET | Refills: 0 | Status: SHIPPED | OUTPATIENT
Start: 2023-06-07 | End: 2023-06-14

## 2023-06-07 RX ORDER — CYCLOBENZAPRINE HCL 5 MG
5 TABLET ORAL 2 TIMES DAILY PRN
Qty: 30 TABLET | Refills: 0 | Status: SHIPPED | OUTPATIENT
Start: 2023-06-07 | End: 2023-07-07

## 2023-06-07 RX ORDER — ONDANSETRON 4 MG/1
4 TABLET, ORALLY DISINTEGRATING ORAL EVERY 8 HOURS PRN
Qty: 20 TABLET | Refills: 0 | Status: SHIPPED | OUTPATIENT
Start: 2023-06-07 | End: 2023-11-03

## 2023-06-07 NOTE — TELEPHONE ENCOUNTER
----- Message from Jj Young MD sent at 6/4/2023  4:44 PM CDT -----  Please schedule her for follow up this week for T8 and T9 compression fractures with a PA. Will need thoracic and lumbar spine X ray.    Thank you,  Jj

## 2023-06-07 NOTE — PROGRESS NOTES
Primary Care  Return/Acute Office Visit - In Person  6/7/2023  Kin Ndhlovu      HPI    Patient is a 83 y.o.   Dorothy Mantilla  has a past medical history of Bradycardia, GERD (gastroesophageal reflux disease), and Hyperlipidemia.    Patient presents with   Chief Complaint   Patient presents with    Follow-up     Er-visit, FALL     Patient had a mechanical fall on 6/4 and sustained a compression fracture.     She states that her pain is moderately controlled and seems to be worse at night when laying down     She has been taking tylenol and naproxen with some improvement and would like additional medication for analgesia          Social History     Social History Narrative    Not on file     Dorothy Mantilla family history includes Basal cell carcinoma in her son; Cancer in her daughter; Colon cancer in her father; Diabetes in her mother; Hypertension in her father, mother, and paternal grandfather; Parkinsonism in her father; Stroke in her paternal grandfather.    Active Medications:  Review of patient's allergies indicates:   Allergen Reactions    Codeine Hives and Edema    Latex, natural rubber Hives    Ciprofloxacin Diarrhea and Nausea And Vomiting     Current Outpatient Medications   Medication Instructions    aspirin (BUFFERIN) 325 MG Tab 325 mg, Oral, Daily, 2 tab     cholecalciferol (vitamin D3) (VITAMIN D3) 1,000 Units, Oral, 2 times daily    cyclobenzaprine (FLEXERIL) 5 mg, Oral, 2 times daily PRN    esomeprazole (NEXIUM) 20 mg, Oral, Before breakfast    LIDOcaine (LIDODERM) 5 % 1 patch, Transdermal, Daily, Remove & Discard patch within 12 hours or as directed by MD    naproxen sodium (ANAPROX) 220 mg, Oral, 2 times daily with meals, 2 tab     ondansetron (ZOFRAN-ODT) 4 mg, Oral, Every 8 hours PRN    oxyCODONE (ROXICODONE) 5 mg, Oral, Every 4 hours PRN       Review of Systems   Cardiovascular:  Negative for leg swelling.   All other systems reviewed and are negative.    Vitals:    06/07/23 0833   BP:  "130/72   BP Location: Left arm   Pulse: 85   Temp: 98.6 °F (37 °C)   SpO2: 95%   Weight: 65.6 kg (144 lb 10 oz)   Height: 4' 11" (1.499 m)       Physical Exam  Vitals reviewed.   Cardiovascular:      Rate and Rhythm: Normal rate.      Pulses: Normal pulses.      Heart sounds: Normal heart sounds.   Pulmonary:      Effort: Pulmonary effort is normal.      Breath sounds: Normal breath sounds.   Musculoskeletal:      Lumbar back: Tenderness present. No swelling.      Right lower leg: No edema.      Left lower leg: No edema.   Neurological:      Mental Status: She is alert.      Gait: Gait is intact.        Assessment and Plan      Acute compression fractures T9 and T10  Osteopenia   Counseled on calcium and vitamin D intake  Patient reports fosamax worsened GERD and zoledronic acid resulted in osteonecrosis of jaw  Patient has follow up scheduled with orthopedics - will defer to ortho for recommendations regarding PT   Continue pain management with naproxen, Tylenol, and flexeril prn   Added oxycodone and zofran for reported side effect of nausea from opioids in the past     Complaint of ear wax   Referral placed to ENT at patient request     Orders Placed This Visit  Orders Placed This Encounter   Procedures    Ambulatory referral/consult to ENT     Standing Status:   Future     Standing Expiration Date:   7/7/2024     Referral Priority:   Routine     Referral Type:   Consultation     Referral Reason:   Specialty Services Required     Requested Specialty:   Otolaryngology     Number of Visits Requested:   1           Upcoming Scheduled Appointments and Follow Up:    Future Appointments   Date Time Provider Department Center   6/21/2023  7:30 AM Travis Aragon PA-C [facility]       Follow Up [facility] (with who? when?): No follow-ups on file.      Extended Emergency Contact Information  Primary Emergency Contact: Ted Espinal  Mobile Phone: [phone]  Relation: Friend      Kin " MD Melchor   Attending Physician  Primary Care  6/7/2023 - 8:44 AM    I spent a total of 35 minutes on the day of the visit.This includes face to face time and non-face to face time preparing to see the patient (eg, review of tests), obtaining and/or reviewing separately obtained history, documenting clinical information in the electronic or other health record, independently interpreting results and communicating results to the patient/family/caregiver, or care coordinator.

## 2023-06-07 NOTE — TELEPHONE ENCOUNTER
Spoke with patient and scheduled xray for today and appointment to see  tomorrow. Patient agreed verbally.

## 2023-06-08 ENCOUNTER — OFFICE VISIT (OUTPATIENT)
Dept: ORTHOPEDICS | Facility: CLINIC | Age: 83
End: 2023-06-08
Payer: MEDICARE

## 2023-06-08 ENCOUNTER — TELEPHONE (OUTPATIENT)
Dept: SPORTS MEDICINE | Facility: CLINIC | Age: 83
End: 2023-06-08
Payer: MEDICARE

## 2023-06-08 VITALS — WEIGHT: 145.5 LBS | HEIGHT: 59 IN | BODY MASS INDEX: 29.33 KG/M2

## 2023-06-08 DIAGNOSIS — S22.060A CLOSED WEDGE COMPRESSION FRACTURE OF T8 VERTEBRA, INITIAL ENCOUNTER: Primary | ICD-10-CM

## 2023-06-08 DIAGNOSIS — S22.070A CLOSED WEDGE COMPRESSION FRACTURE OF T9 VERTEBRA, INITIAL ENCOUNTER: ICD-10-CM

## 2023-06-08 PROCEDURE — 99999 PR PBB SHADOW E&M-EST. PATIENT-LVL III: CPT | Mod: PBBFAC,,, | Performed by: ORTHOPAEDIC SURGERY

## 2023-06-08 PROCEDURE — 99999 PR PBB SHADOW E&M-EST. PATIENT-LVL III: ICD-10-PCS | Mod: PBBFAC,,, | Performed by: ORTHOPAEDIC SURGERY

## 2023-06-08 PROCEDURE — 99204 PR OFFICE/OUTPT VISIT, NEW, LEVL IV, 45-59 MIN: ICD-10-PCS | Mod: S$PBB,GC,, | Performed by: ORTHOPAEDIC SURGERY

## 2023-06-08 PROCEDURE — 99204 OFFICE O/P NEW MOD 45 MIN: CPT | Mod: S$PBB,GC,, | Performed by: ORTHOPAEDIC SURGERY

## 2023-06-08 PROCEDURE — 99213 OFFICE O/P EST LOW 20 MIN: CPT | Mod: PBBFAC | Performed by: ORTHOPAEDIC SURGERY

## 2023-06-08 RX ORDER — CELECOXIB 200 MG/1
200 CAPSULE ORAL DAILY
Qty: 60 CAPSULE | Refills: 1 | Status: SHIPPED | OUTPATIENT
Start: 2023-06-08 | End: 2023-11-03

## 2023-06-08 NOTE — PROGRESS NOTES
DATE: 6/8/2023  PATIENT: Dorothy Mantilla    Attending Physician: Renato Huang M.D.    CHIEF COMPLAINT: back pain    HISTORY:  Dorothy Mantilla is a 83 y.o. female presents for initial evaluation of mid back pain (Back - 8). The pain has been present since Sunday. She was holding a rope while her  cut down a tree and fell backwards as the tree was cut. She presented to the ED for back pain. The patient describes the pain as sharp.  The pain is worse with sitting and lying down and improved by standing up. There is no associated numbness and tingling. There is no subjective weakness. Prior treatments have included naproxen, tylenol, oxycodone, but no PT, bracing, or injections.    The Patient denies myelopathic symptoms such as handwriting changes or difficulty with buttons/coins/keys. Denies perineal paresthesias, bowel/bladder dysfunction.    The patient does not smoke, have DM or endorse IVDU. The patient is not on any blood thinners and does not take chronic narcotics. She is a retired .    PAST MEDICAL/SURGICAL HISTORY:  Past Medical History:   Diagnosis Date    Bradycardia     GERD (gastroesophageal reflux disease)     Hyperlipidemia      Past Surgical History:   Procedure Laterality Date    ADENOIDECTOMY      APPENDECTOMY      BRAIN SURGERY Left     lumpectomy    BUNIONECTOMY Bilateral     COSMETIC SURGERY      INSERTION OF PACEMAKER      JOINT REPLACEMENT Bilateral     knee replacements    SURGICAL REMOVAL OF EXOSTOSIS OF TOE      one toe on each foot    TONSILLECTOMY         Current Medications:   Current Outpatient Medications:     aspirin (BUFFERIN) 325 MG Tab, Take 325 mg by mouth once daily. 2 tab, Disp: , Rfl:     cholecalciferol, vitamin D3, (VITAMIN D3) 25 mcg (1,000 unit) capsule, Take 1,000 Units by mouth 2 (two) times daily., Disp: , Rfl:     cyclobenzaprine (FLEXERIL) 5 MG tablet, Take 1 tablet (5 mg total) by mouth 2 (two) times daily as needed for Muscle spasms.,  "Disp: 30 tablet, Rfl: 0    esomeprazole (NEXIUM) 20 MG capsule, Take 20 mg by mouth before breakfast., Disp: , Rfl:     LIDOcaine (LIDODERM) 5 %, Place 1 patch onto the skin once daily. Remove & Discard patch within 12 hours or as directed by MD, Disp: 15 patch, Rfl: 0    ondansetron (ZOFRAN-ODT) 4 MG TbDL, Take 1 tablet (4 mg total) by mouth every 8 (eight) hours as needed., Disp: 20 tablet, Rfl: 0    oxyCODONE (ROXICODONE) 5 MG immediate release tablet, Take 1 tablet (5 mg total) by mouth every 4 (four) hours as needed for Pain., Disp: 30 tablet, Rfl: 0    celecoxib (CELEBREX) 200 MG capsule, Take 1 capsule (200 mg total) by mouth once daily., Disp: 60 capsule, Rfl: 1    Social History:   Social History     Socioeconomic History    Marital status: Single   Tobacco Use    Smoking status: Never    Smokeless tobacco: Never   Substance and Sexual Activity    Alcohol use: Yes     Alcohol/week: 4.0 standard drinks     Types: 4 Glasses of wine per week    Drug use: Not Currently    Sexual activity: Yes     Partners: Male       REVIEW OF SYSTEMS:  Constitution: Negative. Negative for chills, fever and night sweats.   Cardiovascular: Negative for chest pain and syncope.   Respiratory: Negative for cough and shortness of breath.   Gastrointestinal: See HPI. Negative for nausea/vomiting. Negative for abdominal pain.  Genitourinary: See HPI. Negative for discoloration or dysuria.  Skin: Negative for dry skin, itching and rash.   Hematologic/Lymphatic: Negatvie for bleeding/clotting disorders.   Musculoskeletal: Negative for falls and muscle weakness.   Neurological: See HPI. No history of seizures. No history of cranial surgery or shunts.  Endocrine: Negative for polydipsia, polyphagia and polyuria.   Allergic/Immunologic: Negative for hives and persistent infections.    PHYSICAL EXAMINATION:    Ht 4' 11" (1.499 m)   Wt 66 kg (145 lb 8 oz)   BMI 29.39 kg/m²     General: The patient is a 83 y.o. female in no apparent " distress, the patient is orientatied to person, place and time.   Psych: Normal mood and affect  HEENT: Vision grossly intact, hearing intact to the spoken word.  Lungs: Respirations unlabored.  Gait: Normal station and gait, no difficulty with toe or heel walk.   Skin: Dorsal lumbar skin negative for rashes, lesions, hairy patches and surgical scars.  Range of motion: Lumbar range of motion is acceptable. There is mild thoracic tenderness to palpation.  Spinal Balance: Global saggital and coronal spinal balance acceptable, no significant for scoliosis and kyphosis.  Musculoskeletal: No pain with the range of motion of the bilateral hips. No trochanteric tenderness to palpation.  Vascular: Bilateral lower extremities warm and well perfused, Dorsalis pedis pulses 2+ bilaterally.  Neurological: Normal strength and tone in all major motor groups in the bilateral lower extremities. Normal sensation to light touch in the L2-S1 dermatomes bilaterally.  Deep tendon reflexes symmetric in the bilateral lower extremities.  Negative Babinski bilaterally.    IMAGING:   Today I independently reviewed the following images and my interpretations are as follows:    AP, Lat and Flex/Ex upright T and L-spine films demonstrate T8 and T9 compression fractures and lumbar spondylosis.    CT TL spine showed T8 and T9 compression fractures.    Body mass index is 29.39 kg/m².  Hemoglobin A1C   Date Value Ref Range Status   01/12/2021 5.1 4.0 - 5.6 % Final     Comment:     ADA Screening Guidelines:  5.7-6.4%  Consistent with prediabetes  >or=6.5%  Consistent with diabetes  High levels of fetal hemoglobin interfere with the HbA1C  assay. Heterozygous hemoglobin variants (HbS, HgC, etc)do  not significantly interfere with this assay.   However, presence of multiple variants may affect accuracy.           ASSESSMENT/PLAN:    Dorothy was seen today for mid-back pain.    Diagnoses and all orders for this visit:    Closed wedge compression  fracture of T8 vertebra, initial encounter  -     celecoxib (CELEBREX) 200 MG capsule; Take 1 capsule (200 mg total) by mouth once daily.  -     Ambulatory referral/consult to Physical/Occupational Therapy; Future  -     X-Ray Thoracic Spine AP Lateral  Upright; Future    Closed wedge compression fracture of T9 vertebra, initial encounter  -     X-Ray Thoracic Spine AP Lateral  Upright; Future      Follow up in about 6 weeks (around 7/20/2023).    Patient has T8 and T9 compression fractures. I discussed the natural history of their diagnoses as well as surgical and nonsurgical treatment options. I educated the patient on the importance of core/back strengthening, correct posture, bending/lifting ergonomics, and low-impact aerobic exercises (walking, elliptical, and aquatherapy). I prescribed celebrex. Continue medication tylenol. I will refer the patient to PT for core/back strengthening. She can see Travis Aragon for bone health optimization. Patient will follow up when she returns from [place] with Xrays for thoracic spine.    I have personally examined the patient and agree with the above plan.    Renato Huang MD  Orthopaedic Spine Surgeon  Department of Orthopaedic Surgery  [phone]

## 2023-06-08 NOTE — TELEPHONE ENCOUNTER
LVM for pt in regards to her appt with Dr. Monterroso trying to see what she is coming in for because Dr. Monterroso does not see jaws

## 2023-06-09 ENCOUNTER — CLINICAL SUPPORT (OUTPATIENT)
Dept: REHABILITATION | Facility: HOSPITAL | Age: 83
End: 2023-06-09
Attending: ORTHOPAEDIC SURGERY
Payer: MEDICARE

## 2023-06-09 DIAGNOSIS — S22.060A CLOSED WEDGE COMPRESSION FRACTURE OF T8 VERTEBRA, INITIAL ENCOUNTER: ICD-10-CM

## 2023-06-09 DIAGNOSIS — S22.070D COMPRESSION FRACTURE OF T9 VERTEBRA WITH ROUTINE HEALING, SUBSEQUENT ENCOUNTER: Primary | ICD-10-CM

## 2023-06-09 DIAGNOSIS — M62.81 MUSCLE WEAKNESS OF EXTREMITY: ICD-10-CM

## 2023-06-09 PROCEDURE — 97161 PT EVAL LOW COMPLEX 20 MIN: CPT | Mod: PO

## 2023-06-09 PROCEDURE — 97110 THERAPEUTIC EXERCISES: CPT | Mod: PO

## 2023-06-09 NOTE — PLAN OF CARE
[facility]PATIENT [facility]ERAP[facility]   Physical Therapy Initial Evaluation      Name: Dorothy SAM [facility]  Clinic Number: [id number]    Therapy Diagnosis:   Encounter Diagnoses   Name Primary?    Closed wedge compression fracture of T8 vertebra, initial encounter     Compression fracture of T9 vertebra with routine healing, subsequent encounter Yes    Muscle weakness of extremity         Physician: Renato Huang MD    Physician Orders: PT Eval and Treat  Medical Diagnosis from Referral: Closed wedge compression fracture of T8 vertebra, initial encounter [S22.060A]  Evaluation Date: 6/9/2023  Authorization Period Expiration: 6/7/24  Plan of Care Expiration: 9/9/23  Progress Note Due: 7/9/23  Visit # / Visits authorized: 1/ 1   FOTO: 1/3 (perform at every visit)    Precautions: Standard, Osteopenia, Compression fractures T8 and T9    Time In: 1:30  Time Out: 2:00  Total Appointment Time (timed & untimed codes): 30 minutes    Subjective     Date of onset: Sunday 6/4/23    History of current condition - Dorothy reports: she was holding a rope while her  cut down a tree and fell backwards when the tree was cut. The was not hit by the tree. She sustained a T8 and T9 compression fracture. This has been the most painful injury that she has ever had. She has pain sitting recumbent, but is able to lie down with pillows under her back and sit straight up. The heating pain is helpful. She is ot able to take pain medication because it makes her nauseated. She has previously come to PT for core strengthening with positive results. No neurological symptoms reported today.     Falls: yes, during injury    Imaging: see chart    Prior Therapy: not for this  Social History:  lives with their family; bedroom on second level. She is able to navigate stairs slowly  Occupation: retired   Prior Level of Function: very active in doing yard work, errands, walking 5-6 miles/day  Current Level of Function: limited activity with pain,  difficulty moving in bed    Pain:  Current 8/10, worst 10/10, best 6/10   Location: right thoracic spine   Description: constant ache  Aggravating Factors: reclining back, getting up or down, turning in bed  Easing Factors: heat, rest, tylenol, naproxen    Patients goals: have her back to go back to normal so she can walk without pain      Medical History:   Past Medical History:   Diagnosis Date    Bradycardia     GERD (gastroesophageal reflux disease)     Hyperlipidemia        Surgical History:   Dorothy Mantilla  has a past surgical history that includes Tonsillectomy; Adenoidectomy; Appendectomy; Brain surgery (Left); Bunionectomy (Bilateral); Surgical removal of exostosis of toe; Joint replacement (Bilateral); Cosmetic surgery; and Insertion of pacemaker.    Medications:   Dorothy has a current medication list which includes the following prescription(s): aspirin, celecoxib, cholecalciferol (vitamin d3), cyclobenzaprine, esomeprazole, lidocaine, ondansetron, and oxycodone.    Allergies:   Review of patient's allergies indicates:   Allergen Reactions    Reclast [zoledronic acid-mannitol-water] Swelling    Codeine Hives and Edema    Latex, natural rubber Hives    Ciprofloxacin Diarrhea and Nausea And Vomiting        Objective      Observation: forward flexed posture    Posture: see above     Hip Range of Motion:   Right active Left active    Flexion 75 with pain 60 with pain   Ext. Rotation 15 15   Int. Rotation 35 20       Lower Extremity Strength  Right LE  Left LE    Quadriceps: 5/5 Quadriceps: 5/5   Hamstrings: 4+/5 Hamstrings: 4+/5   Iliopsoas: 4-/5 Iliopsoas: 4/5   PGM: 3+/5 PGM: Unable to lie on right side   Hip ER: 4-/5 Hip ER: 4/5   Hip IR: 3+/5 Hip IR: 4-/5     Dorsiflexion: 5/5 bilaterally     Flexibility:    Hamstrings: R = significant limitation with pain in back ; L = significant limitation       Joint Mobility: NT due to pain     Palpation: tenderness throughout thoracic  spine      Limitation/Restriction for FOTO thoracic spine Survey    Therapist reviewed FOTO scores for Dorothy S Deblanc on 6/9/2023.   FOTO documents entered into [email or web address] ([employer] - see Media section.    Limitation Score: 75%         Treatment     Total Treatment time (time-based codes) separate from Evaluation: 10 minutes     Dorothy received the treatments listed below:      therapeutic exercises to develop strength, endurance, ROM, flexibility, posture, and core stabilization for 10 minutes including:  Pt education on pain relief techniques, bracing for spinal stability, activity modification, and activities to avoid    manual therapy techniques: Joint mobilizations, Manual traction, and Soft tissue Mobilization were applied for 0 minutes, including:      neuromuscular re-education activities to improve: Coordination, Proprioception, and Posture for 0 minutes. The following activities were included:      therapeutic activities to improve functional performance for 0 minutes, including:      Patient Education and Home Exercises     Education provided:   - education on condition  -see above    Written Home Exercises Provided: not provided today. Exercises were reviewed and Dorothy was able to demonstrate them prior to the end of the session.  Dorothy demonstrated good  understanding of the education provided. See EMR under Patient Instructions for exercises provided during therapy sessions.    Assessment     Dorothy is a 83 y.o. female referred to outpatient Physical Therapy with a medical diagnosis of Closed wedge compression fracture of T8 vertebra, initial encounter [S22.060A]. Patient presents with acute onset thoracic pain following fall onto her back approx 1 week ago. She presents today with mild lower extremity weakness and pain with functional mobility including rolling in bed and standing from chair. She will benefit from skilled PT to address deficits and improve core strength. Due to pt leaving [facility], we will  schedule 1x/wk for 2 weeks to establish home exercise program for self management of condition.     Patient prognosis is Fair.   Patient will benefit from skilled outpatient Physical Therapy to address the deficits stated above and in the chart below, provide patient /family education, and to maximize patientt's level of independence.     Plan of care discussed with patient: Yes  Patient's spiritual, cultural and educational needs considered and patient is agreeable to the plan of care and goals as stated below:     Anticipated Barriers for therapy: recent fracture    Medical Necessity is demonstrated by the following  History  Co-morbidities and personal factors that may impact the plan of care [x] LOW: no personal factors / co-morbidities  [] MODERATE: 1-2 personal factors / co-morbidities  [] HIGH: 3+ personal factors / co-morbidities    Moderate / High Support Documentation:   Co-morbidities affecting plan of care:     Personal Factors:   age     Examination  Body Structures and Functions, activity limitations and participation restrictions that may impact the plan of care [x] LOW: addressing 1-2 elements  [] MODERATE: 3+ elements  [] HIGH: 4+ elements (please support below)    Moderate / High Support Documentation:      Clinical Presentation [x] LOW: stable  [] MODERATE: Evolving  [] HIGH: Unstable     Decision Making/ Complexity Score: low       GOALS: Short Term Goals:  4 weeks  2. Pt to verbalize understanding of activities to avoid including spinal flexion and rotation   3. PT to create and issue HEP  4. Pt to tolerate HEP to improve ROM and independence with ADL's  5. Pt will report at CJ level (20-40% impaired) on LEFS  to demonstrate increase in LE function with every day tasks.      Plan     Plan of care Certification: 6/9/2023 to 9/9/23.    Outpatient Physical Therapy 1 times weekly for 4 weeks to include the following interventions: Cervical/Lumbar Traction, Electrical Stimulation , Gait Training,  Manual Therapy, Moist Heat/ Ice, Neuromuscular Re-ed, Patient Education, Therapeutic Activities, Therapeutic Exercise, and Ultrasound.     Jaylin Jin, PT

## 2023-07-25 ENCOUNTER — PES CALL (OUTPATIENT)
Dept: ADMINISTRATIVE | Facility: CLINIC | Age: 83
End: 2023-07-25
Payer: MEDICARE

## 2023-11-03 ENCOUNTER — OFFICE VISIT (OUTPATIENT)
Dept: PRIMARY CARE CLINIC | Facility: CLINIC | Age: 83
End: 2023-11-03
Payer: MEDICARE

## 2023-11-03 VITALS
HEART RATE: 71 BPM | OXYGEN SATURATION: 99 % | SYSTOLIC BLOOD PRESSURE: 132 MMHG | WEIGHT: 142.19 LBS | BODY MASS INDEX: 28.67 KG/M2 | DIASTOLIC BLOOD PRESSURE: 84 MMHG | HEIGHT: 59 IN

## 2023-11-03 DIAGNOSIS — Z00.00 ANNUAL PHYSICAL EXAM: Primary | ICD-10-CM

## 2023-11-03 DIAGNOSIS — I70.0 AORTIC ATHEROSCLEROSIS: ICD-10-CM

## 2023-11-03 DIAGNOSIS — E78.5 HYPERLIPIDEMIA, UNSPECIFIED HYPERLIPIDEMIA TYPE: ICD-10-CM

## 2023-11-03 DIAGNOSIS — D47.2 BENIGN MONOCLONAL GAMMOPATHY: ICD-10-CM

## 2023-11-03 DIAGNOSIS — Z95.0 PACEMAKER: ICD-10-CM

## 2023-11-03 DIAGNOSIS — S22.060A CLOSED WEDGE COMPRESSION FRACTURE OF T8 VERTEBRA, INITIAL ENCOUNTER: ICD-10-CM

## 2023-11-03 DIAGNOSIS — D64.9 ANEMIA, UNSPECIFIED TYPE: ICD-10-CM

## 2023-11-03 DIAGNOSIS — M81.0 OSTEOPOROSIS, UNSPECIFIED OSTEOPOROSIS TYPE, UNSPECIFIED PATHOLOGICAL FRACTURE PRESENCE: ICD-10-CM

## 2023-11-03 PROBLEM — M85.80 OSTEOPENIA: Status: RESOLVED | Noted: 2023-06-07 | Resolved: 2023-11-03

## 2023-11-03 PROBLEM — S22.070A COMPRESSION FRACTURE OF T9 VERTEBRA: Status: RESOLVED | Noted: 2023-06-04 | Resolved: 2023-11-03

## 2023-11-03 PROBLEM — M62.81 MUSCLE WEAKNESS OF EXTREMITY: Status: RESOLVED | Noted: 2023-06-09 | Resolved: 2023-11-03

## 2023-11-03 PROCEDURE — 99214 OFFICE O/P EST MOD 30 MIN: CPT | Mod: S$PBB,,, | Performed by: STUDENT IN AN ORGANIZED HEALTH CARE EDUCATION/TRAINING PROGRAM

## 2023-11-03 PROCEDURE — 99214 PR OFFICE/OUTPT VISIT, EST, LEVL IV, 30-39 MIN: ICD-10-PCS | Mod: S$PBB,,, | Performed by: STUDENT IN AN ORGANIZED HEALTH CARE EDUCATION/TRAINING PROGRAM

## 2023-11-03 PROCEDURE — 99999 PR PBB SHADOW E&M-EST. PATIENT-LVL IV: CPT | Mod: PBBFAC,,, | Performed by: STUDENT IN AN ORGANIZED HEALTH CARE EDUCATION/TRAINING PROGRAM

## 2023-11-03 PROCEDURE — 99999 PR PBB SHADOW E&M-EST. PATIENT-LVL IV: ICD-10-PCS | Mod: PBBFAC,,, | Performed by: STUDENT IN AN ORGANIZED HEALTH CARE EDUCATION/TRAINING PROGRAM

## 2023-11-03 PROCEDURE — 99214 OFFICE O/P EST MOD 30 MIN: CPT | Mod: PBBFAC,PN | Performed by: STUDENT IN AN ORGANIZED HEALTH CARE EDUCATION/TRAINING PROGRAM

## 2023-11-03 RX ORDER — NAPROXEN 250 MG/1
250 TABLET ORAL 2 TIMES DAILY WITH MEALS
COMMUNITY

## 2023-11-03 RX ORDER — AMOXICILLIN AND CLAVULANATE POTASSIUM 500; 125 MG/1; MG/1
1 TABLET, FILM COATED ORAL 3 TIMES DAILY
COMMUNITY
Start: 2023-07-01 | End: 2023-11-03

## 2023-11-03 RX ORDER — NEOMYCIN SULFATE, POLYMYXIN B SULFATE, HYDROCORTISONE 3.5; 10000; 1 MG/ML; [USP'U]/ML; MG/ML
SOLUTION/ DROPS AURICULAR (OTIC)
COMMUNITY
Start: 2023-07-08 | End: 2023-11-03

## 2023-11-03 RX ORDER — NIRMATRELVIR AND RITONAVIR 300-100 MG
KIT ORAL
COMMUNITY
Start: 2023-07-01 | End: 2023-11-03

## 2023-11-03 RX ORDER — ESOMEPRAZOLE MAGNESIUM 10 MG/1
10 GRANULE, FOR SUSPENSION, EXTENDED RELEASE ORAL
COMMUNITY

## 2023-11-03 RX ORDER — FERROUS SULFATE TAB 325 MG (65 MG ELEMENTAL FE) 325 (65 FE) MG
TAB ORAL EVERY OTHER DAY
COMMUNITY
Start: 2023-08-25 | End: 2023-11-03

## 2023-11-03 RX ORDER — SULFAMETHOXAZOLE AND TRIMETHOPRIM 800; 160 MG/1; MG/1
1 TABLET ORAL 2 TIMES DAILY
COMMUNITY
Start: 2023-07-01 | End: 2023-11-03

## 2023-11-03 RX ORDER — ROSUVASTATIN CALCIUM 5 MG/1
5 TABLET, COATED ORAL
COMMUNITY
Start: 2023-08-15

## 2023-11-03 RX ORDER — ACETAMINOPHEN 120 MG/1
120 SUPPOSITORY RECTAL EVERY 4 HOURS PRN
COMMUNITY
End: 2023-11-03

## 2023-11-03 NOTE — ASSESSMENT & PLAN NOTE
Discussed recommended preventative health screenings. Encouraged to continue healthy diet and regular exercise. RSV vaccine given today.

## 2023-11-03 NOTE — PROGRESS NOTES
Dorothy Mantilla  1940        Subjective     Chief Complaint: annual physical with labs    History of Present Illness:  Ms. Dorothy Mantilla is a 83 y.o. female who presents to clinic for an annual visit. Last seen on 2023 by Dr. Roche for back pain after a mechanical fall.    She spends half the year in [place], so she gets most of her healthcare there, but she comes back to [place] every winter. She has records, but not with her today.    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    Did a DEXA scan this summer. Has osteoporosis  Would like testing for uric acid because she has done a lot of research into Dr. Moreno's theories on uric acid.    Sometimes gets vertigo, which is controlled by the Epley maneuver.    Was diagnosed with iron deficiency anemia in [place], was taking iron pills but recently stopped.      PAST HISTORY:     Past Medical History:   Diagnosis Date    Bradycardia     GERD (gastroesophageal reflux disease)     Hyperlipidemia        Past Surgical History:   Procedure Laterality Date    ADENOIDECTOMY      APPENDECTOMY      BRAIN SURGERY Left     lumpectomy    BUNIONECTOMY Bilateral     COSMETIC SURGERY      INSERTION OF PACEMAKER      JOINT REPLACEMENT Bilateral     knee replacements    SURGICAL REMOVAL OF EXOSTOSIS OF TOE      one toe on each foot    TONSILLECTOMY         Family History   Problem Relation Age of Onset    Hypertension Mother          at age 99    Diabetes Mother     Parkinsonism Father     Hypertension Father     Colon cancer Father     Cancer Daughter         skin    Basal cell carcinoma Son     Stroke Paternal Grandfather     Hypertension Paternal Grandfather        Social History     Socioeconomic History    Marital status:    Tobacco Use    Smoking status: Never    Smokeless tobacco: Never   Substance and Sexual Activity    Alcohol use: Yes     Alcohol/week: 4.0 standard drinks of  alcohol     Types: 4 Glasses of wine per week    Drug use: Not Currently    Sexual activity: Yes     Partners: Male       MEDICATIONS & ALLERGIES:     Current Outpatient Medications on File Prior to Visit   Medication Sig    cholecalciferol, vitamin D3, (VITAMIN D3) 25 mcg (1,000 unit) capsule Take 1,000 Units by mouth 2 (two) times daily.    esomeprazole magnesium (NEXIUM) 10 mg suspension Take 10 mg by mouth before breakfast.    multivitamin with minerals tablet Take 1 tablet by mouth once daily.    naproxen (NAPROSYN) 250 MG tablet Take 250 mg by mouth 2 (two) times daily with meals.    rosuvastatin (CRESTOR) 5 MG tablet Take 5 mg by mouth.    [DISCONTINUED] esomeprazole (NEXIUM) 20 MG capsule Take 10 mg by mouth before breakfast.    [DISCONTINUED] FEROSUL 325 mg (65 mg iron) Tab tablet Take by mouth every other day.    [DISCONTINUED] acetaminophen (TYLENOL) 120 MG suppository Place 120 mg rectally every 4 (four) hours as needed for Temperature greater than.    [DISCONTINUED] amoxicillin-clavulanate 500-125mg (AUGMENTIN) 500-125 mg Tab Take 1 tablet by mouth 3 (three) times daily.    [DISCONTINUED] aspirin (BUFFERIN) 325 MG Tab Take 325 mg by mouth once daily. 2 tab    [DISCONTINUED] celecoxib (CELEBREX) 200 MG capsule Take 1 capsule (200 mg total) by mouth once daily. (Patient not taking: Reported on 11/3/2023)    [DISCONTINUED] LIDOcaine (LIDODERM) 5 % Place 1 patch onto the skin once daily. Remove & Discard patch within 12 hours or as directed by MD (Patient not taking: Reported on 11/3/2023)    [DISCONTINUED] neomycin-polymyxin-hydrocortisone (CORTISPORIN) otic solution Place into both ears.    [DISCONTINUED] ondansetron (ZOFRAN-ODT) 4 MG TbDL Take 1 tablet (4 mg total) by mouth every 8 (eight) hours as needed. (Patient not taking: Reported on 11/3/2023)    [DISCONTINUED] PAXLOVID 300 mg (150 mg x 2)-100 mg copackaged tablets (EUA) Take by mouth.    [DISCONTINUED] sulfamethoxazole-trimethoprim 800-160mg  "(BACTRIM DS) 800-160 mg Tab Take 1 tablet by mouth 2 (two) times daily.     No current facility-administered medications on file prior to visit.       Review of patient's allergies indicates:   Allergen Reactions    Reclast [zoledronic acid-mannitol-water] Swelling    Codeine Hives and Edema    Latex, natural rubber Hives    Ciprofloxacin Diarrhea and Nausea And Vomiting       OBJECTIVE:     Vital Signs:  Vitals:    11/03/23 1459   BP: 132/84   BP Location: Left arm   Patient Position: Sitting   BP Method: Medium (Automatic)   Pulse: 71   SpO2: 99%   Weight: 64.5 kg (142 lb 3.2 oz)   Height: 4' 11" (1.499 m)       Body mass index is 28.72 kg/m².     Physical Exam:  Physical Exam  Vitals reviewed.   Constitutional:       General: She is not in acute distress.     Comments: Appears younger than stated age.   HENT:      Head: Normocephalic.      Right Ear: Tympanic membrane and ear canal normal.      Left Ear: Tympanic membrane and ear canal normal.      Nose: Nose normal.      Mouth/Throat:      Mouth: Mucous membranes are moist.      Pharynx: No oropharyngeal exudate or posterior oropharyngeal erythema.   Eyes:      Conjunctiva/sclera: Conjunctivae normal.   Cardiovascular:      Rate and Rhythm: Normal rate and regular rhythm.      Heart sounds: No murmur heard.     No friction rub. No gallop.      Comments: Radial pulses 2+ bilaterally  Pulmonary:      Effort: Pulmonary effort is normal.      Breath sounds: Normal breath sounds. No wheezing, rhonchi or rales.   Abdominal:      General: Abdomen is flat. Bowel sounds are normal.      Palpations: Abdomen is soft.      Tenderness: There is no abdominal tenderness. There is no guarding.   Musculoskeletal:         General: No swelling or deformity.      Cervical back: Normal range of motion and neck supple.      Right lower leg: No edema.      Left lower leg: No edema.   Lymphadenopathy:      Cervical: No cervical adenopathy.   Skin:     General: Skin is warm and dry.     " " Findings: No rash.   Neurological:      General: No focal deficit present.      Mental Status: She is alert and oriented to person, place, and time.   Psychiatric:         Mood and Affect: Mood normal.         Behavior: Behavior normal.          Laboratory  Lab Results   Component Value Date    WBC 6.53 04/08/2023    HGB 14.0 04/08/2023    HCT 43.4 04/08/2023    MCV 94 04/08/2023     04/08/2023     Lab Results   Component Value Date    GLU 80 04/08/2023     04/08/2023    K 3.5 04/08/2023     04/08/2023    CO2 23 04/08/2023    BUN 18 04/08/2023    CREATININE 0.7 04/08/2023    CALCIUM 9.2 04/08/2023     No results found for: "INR", "PROTIME"  Lab Results   Component Value Date    HGBA1C 5.1 01/12/2021           Health Maintenance         Date Due Completion Date    Pneumococcal Vaccines (Age 65+) (1 - PCV) 03/26/1946 10/27/2021    RSV Vaccine (Age 60+) (1 - 1-dose 60+ series) Never done ---    COVID-19 Vaccine (4 - 2023-24 season) 09/01/2023 3/30/2022    DEXA Scan 08/17/2026 8/17/2023    Lipid Panel 07/17/2028 7/17/2023    TETANUS VACCINE 05/01/2032 5/1/2022              ASSESSMENT & PLAN:   Ms. Dorothy Mantilla is a 83 y.o. female who was seen today in clinic for an annual physical.      1. Annual physical exam  Assessment & Plan:  Discussed recommended preventative health screenings. Encouraged to continue healthy diet and regular exercise. RSV vaccine given today.    Orders:  -     COMPREHENSIVE METABOLIC PANEL; Future; Expected date: 11/03/2023    2. Hyperlipidemia, unspecified hyperlipidemia type  Assessment & Plan:  Continue statin. Patient did not tolerate high dose statin in the past, so will continue on low dose for now.    Orders:  -     LIPID PANEL; Future; Expected date: 11/03/2023    3. Aortic atherosclerosis  Assessment & Plan:  Will obtain lipid panel. Continue statin.      4. Osteoporosis, unspecified osteoporosis type, unspecified pathological fracture presence  Assessment & " Plan:  Diagnosed on DEXA in [place] this past year.  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  Continue calcium and vitamin D, as well as weight-bearing exercise.      5. Anemia, unspecified type  Assessment & Plan:  Patient reports iron deficiency anemia dx in July in [place]. After that, she took iron pills. Will obtain CBC and iron studies to reassess.    Orders:  -     IRON AND TIBC; Future; Expected date: 11/03/2023  -     FERRITIN; Future; Expected date: 11/03/2023  -     CBC W/ AUTO DIFFERENTIAL; Future; Expected date: 11/03/2023    6. Benign monoclonal gammopathy  Assessment & Plan:  Patient reports this was diagnosed 5 years ago in [place] and she underwent a bone marrow biopsy, which was normal. Will check sPEP today.    Orders:  -     IMMUNOGLOBULINS (IGG, IGA, IGM) QUANTITATIVE; Future; Expected date: 11/03/2023  -     URIC ACID; Future; Expected date: 11/03/2023  -     C-REACTIVE PROTEIN; Future; Expected date: 11/03/2023  -     Sedimentation rate; Future; Expected date: 11/03/2023  -     Cancel: Protein electrophoresis, timed urine; Future  -     PROTEIN ELECTROPHORESIS, SERUM; Future; Expected date: 11/03/2023    7. Closed wedge compression fracture of T8 vertebra, initial encounter  Assessment & Plan:  Stable. Patient denies pain.      8. Pacemaker  Assessment & Plan:  Stable. Followed by cardiology in [place].         RTC in 1 year.    Renata Clarke MD  Internal Medicine and Pediatrics        Portions of this note may have been generated using voice recognition software.  Please excuse any spelling/grammatical errors. Occasional wrong-word or sound-a-like substitutions may have also occurred due to the inherent limitations of voice recognition software. Please read the chart carefully and recognize, using context, where substitutions have occurred.

## 2023-11-03 NOTE — PROGRESS NOTES
"      Assessment:     1. Annual physical exam    2. Hyperlipidemia, unspecified hyperlipidemia type    3. Osteopenia, unspecified location    4. Closed wedge compression fracture of T8 vertebra, initial encounter    5. Aortic atherosclerosis      Plan:            1. Annual physical exam    2. Hyperlipidemia, unspecified hyperlipidemia type    3. Osteopenia, unspecified location    4. Closed wedge compression fracture of T8 vertebra, initial encounter    5. Aortic atherosclerosis        CHIEF COMPLAINT: ***    HPI: Dorothy Mantilla is a 83 y.o. female with is here today for general exam.     Denies chest pain, shortness of breath    Current Outpatient Medications   Medication Instructions    aspirin (BUFFERIN) 325 MG Tab 325 mg, Oral, Daily, 2 tab     celecoxib (CELEBREX) 200 mg, Oral, Daily    cholecalciferol (vitamin D3) (VITAMIN D3) 1,000 Units, Oral, 2 times daily    esomeprazole (NEXIUM) 20 mg, Oral, Before breakfast    LIDOcaine (LIDODERM) 5 % 1 patch, Transdermal, Daily, Remove & Discard patch within 12 hours or as directed by MD    ondansetron (ZOFRAN-ODT) 4 mg, Oral, Every 8 hours PRN       Lab Results   Component Value Date    HGBA1C 5.1 01/12/2021     No results found for: "MICALBCREAT"  Lab Results   Component Value Date    LDLCALC 202.8 (H) 01/12/2021    CHOL 292 (H) 01/12/2021    HDL 61 01/12/2021    TRIG 141 01/12/2021       Lab Results   Component Value Date     04/08/2023    K 3.5 04/08/2023     04/08/2023    CO2 23 04/08/2023    GLU 80 04/08/2023    BUN 18 04/08/2023    CREATININE 0.7 04/08/2023    CALCIUM 9.2 04/08/2023    PROT 6.6 04/08/2023    ALBUMIN 3.5 04/08/2023    BILITOT 0.7 04/08/2023    ALKPHOS 71 04/08/2023    AST 22 04/08/2023    ALT 13 04/08/2023    ANIONGAP 11 04/08/2023    ESTGFRAFRICA >60.0 01/12/2021    EGFRNONAA >60.0 01/12/2021    WBC 6.53 04/08/2023    HGB 14.0 04/08/2023    HGB 15.0 01/12/2021    HCT 43.4 04/08/2023    MCV 94 04/08/2023     04/08/2023    TSH " 3.459 01/12/2021    HEPCAB Non-reactive 04/08/2023       Lab Results   Component Value Date    [id number] 34 01/12/2021         Past Medical History:   Diagnosis Date    Bradycardia     GERD (gastroesophageal reflux disease)     Hyperlipidemia      Past Surgical History:   Procedure Laterality Date    ADENOIDECTOMY      APPENDECTOMY      BRAIN SURGERY Left     lumpectomy    BUNIONECTOMY Bilateral     COSMETIC SURGERY      INSERTION OF PACEMAKER      JOINT REPLACEMENT Bilateral     knee replacements    SURGICAL REMOVAL OF EXOSTOSIS OF TOE      one toe on each foot    TONSILLECTOMY       There were no vitals filed for this visit.  Objective:   Physical Exam

## 2023-11-03 NOTE — ASSESSMENT & PLAN NOTE
Patient reports iron deficiency anemia dx in July in [place]. After that, she took iron pills. Will obtain CBC and iron studies to reassess.

## 2023-11-03 NOTE — ASSESSMENT & PLAN NOTE
Continue statin. Patient did not tolerate high dose statin in the past, so will continue on low dose for now.

## 2023-11-03 NOTE — ASSESSMENT & PLAN NOTE
Diagnosed on DEXA in [place] this past year.  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  Continue calcium and vitamin D, as well as weight-bearing exercise.

## 2023-11-03 NOTE — PATIENT INSTRUCTIONS
It was so great to meet you!  Keep up the walking and healthy eating.  I'll send you a message through the portal.

## 2023-11-03 NOTE — ASSESSMENT & PLAN NOTE
Patient reports this was diagnosed 5 years ago in [place] and she underwent a bone marrow biopsy, which was normal. Will check sPEP today.

## 2023-11-06 ENCOUNTER — TELEPHONE (OUTPATIENT)
Dept: PRIMARY CARE CLINIC | Facility: CLINIC | Age: 83
End: 2023-11-06
Payer: MEDICARE

## 2023-11-06 ENCOUNTER — LAB VISIT (OUTPATIENT)
Dept: LAB | Facility: HOSPITAL | Age: 83
End: 2023-11-06
Attending: STUDENT IN AN ORGANIZED HEALTH CARE EDUCATION/TRAINING PROGRAM
Payer: MEDICARE

## 2023-11-06 DIAGNOSIS — Z00.00 ANNUAL PHYSICAL EXAM: ICD-10-CM

## 2023-11-06 DIAGNOSIS — D64.9 ANEMIA, UNSPECIFIED TYPE: ICD-10-CM

## 2023-11-06 DIAGNOSIS — E78.5 HYPERLIPIDEMIA, UNSPECIFIED HYPERLIPIDEMIA TYPE: ICD-10-CM

## 2023-11-06 DIAGNOSIS — D47.2 BENIGN MONOCLONAL GAMMOPATHY: ICD-10-CM

## 2023-11-06 LAB
ALBUMIN SERPL BCP-MCNC: 4.1 G/DL (ref 3.5–5.2)
ALP SERPL-CCNC: 76 U/L (ref 55–135)
ALT SERPL W/O P-5'-P-CCNC: 21 U/L (ref 10–44)
ANION GAP SERPL CALC-SCNC: 12 MMOL/L (ref 8–16)
AST SERPL-CCNC: 32 U/L (ref 10–40)
BASOPHILS # BLD AUTO: 0.06 K/UL (ref 0–0.2)
BASOPHILS NFR BLD: 1.7 % (ref 0–1.9)
BILIRUB SERPL-MCNC: 0.7 MG/DL (ref 0.1–1)
BUN SERPL-MCNC: 17 MG/DL (ref 8–23)
CALCIUM SERPL-MCNC: 9.9 MG/DL (ref 8.7–10.5)
CHLORIDE SERPL-SCNC: 104 MMOL/L (ref 95–110)
CHOLEST SERPL-MCNC: 211 MG/DL (ref 120–199)
CHOLEST/HDLC SERPL: 3.2 {RATIO} (ref 2–5)
CO2 SERPL-SCNC: 25 MMOL/L (ref 23–29)
CREAT SERPL-MCNC: 0.7 MG/DL (ref 0.5–1.4)
CRP SERPL-MCNC: 11.3 MG/L (ref 0–8.2)
DIFFERENTIAL METHOD: ABNORMAL
EOSINOPHIL # BLD AUTO: 0.1 K/UL (ref 0–0.5)
EOSINOPHIL NFR BLD: 3.7 % (ref 0–8)
ERYTHROCYTE [DISTWIDTH] IN BLOOD BY AUTOMATED COUNT: 17.5 % (ref 11.5–14.5)
ERYTHROCYTE [SEDIMENTATION RATE] IN BLOOD BY PHOTOMETRIC METHOD: 10 MM/HR (ref 0–36)
EST. GFR  (NO RACE VARIABLE): >60 ML/MIN/1.73 M^2
FERRITIN SERPL-MCNC: 65 NG/ML (ref 20–300)
GLUCOSE SERPL-MCNC: 97 MG/DL (ref 70–110)
HCT VFR BLD AUTO: 49.3 % (ref 37–48.5)
HDLC SERPL-MCNC: 66 MG/DL (ref 40–75)
HDLC SERPL: 31.3 % (ref 20–50)
HGB BLD-MCNC: 15.8 G/DL (ref 12–16)
IGA SERPL-MCNC: 71 MG/DL (ref 40–350)
IGG SERPL-MCNC: 776 MG/DL (ref 650–1600)
IGM SERPL-MCNC: 1000 MG/DL (ref 50–300)
IMM GRANULOCYTES # BLD AUTO: 0.01 K/UL (ref 0–0.04)
IMM GRANULOCYTES NFR BLD AUTO: 0.3 % (ref 0–0.5)
IRON SERPL-MCNC: 76 UG/DL (ref 30–160)
LDLC SERPL CALC-MCNC: 127.6 MG/DL (ref 63–159)
LYMPHOCYTES # BLD AUTO: 0.9 K/UL (ref 1–4.8)
LYMPHOCYTES NFR BLD: 25.5 % (ref 18–48)
MCH RBC QN AUTO: 29.1 PG (ref 27–31)
MCHC RBC AUTO-ENTMCNC: 32 G/DL (ref 32–36)
MCV RBC AUTO: 91 FL (ref 82–98)
MONOCYTES # BLD AUTO: 0.3 K/UL (ref 0.3–1)
MONOCYTES NFR BLD: 8.8 % (ref 4–15)
NEUTROPHILS # BLD AUTO: 2.1 K/UL (ref 1.8–7.7)
NEUTROPHILS NFR BLD: 60 % (ref 38–73)
NONHDLC SERPL-MCNC: 145 MG/DL
NRBC BLD-RTO: 0 /100 WBC
PLATELET # BLD AUTO: 274 K/UL (ref 150–450)
PMV BLD AUTO: 10.2 FL (ref 9.2–12.9)
POTASSIUM SERPL-SCNC: 3.9 MMOL/L (ref 3.5–5.1)
PROT SERPL-MCNC: 8 G/DL (ref 6–8.4)
RBC # BLD AUTO: 5.43 M/UL (ref 4–5.4)
SATURATED IRON: 17 % (ref 20–50)
SODIUM SERPL-SCNC: 141 MMOL/L (ref 136–145)
TOTAL IRON BINDING CAPACITY: 441 UG/DL (ref 250–450)
TRANSFERRIN SERPL-MCNC: 298 MG/DL (ref 200–375)
TRIGL SERPL-MCNC: 87 MG/DL (ref 30–150)
URATE SERPL-MCNC: 4.1 MG/DL (ref 2.4–5.7)
WBC # BLD AUTO: 3.53 K/UL (ref 3.9–12.7)

## 2023-11-06 PROCEDURE — 84165 PROTEIN E-PHORESIS SERUM: CPT | Performed by: STUDENT IN AN ORGANIZED HEALTH CARE EDUCATION/TRAINING PROGRAM

## 2023-11-06 PROCEDURE — 80061 LIPID PANEL: CPT | Performed by: STUDENT IN AN ORGANIZED HEALTH CARE EDUCATION/TRAINING PROGRAM

## 2023-11-06 PROCEDURE — 82784 ASSAY IGA/IGD/IGG/IGM EACH: CPT | Mod: 59 | Performed by: STUDENT IN AN ORGANIZED HEALTH CARE EDUCATION/TRAINING PROGRAM

## 2023-11-06 PROCEDURE — 86334 IMMUNOFIX E-PHORESIS SERUM: CPT | Performed by: STUDENT IN AN ORGANIZED HEALTH CARE EDUCATION/TRAINING PROGRAM

## 2023-11-06 PROCEDURE — 84165 PROTEIN E-PHORESIS SERUM: CPT | Mod: 26,,, | Performed by: PATHOLOGY

## 2023-11-06 PROCEDURE — 86334 IMMUNOFIX E-PHORESIS SERUM: CPT | Mod: 26,,, | Performed by: PATHOLOGY

## 2023-11-06 PROCEDURE — 83540 ASSAY OF IRON: CPT | Performed by: STUDENT IN AN ORGANIZED HEALTH CARE EDUCATION/TRAINING PROGRAM

## 2023-11-06 PROCEDURE — 86140 C-REACTIVE PROTEIN: CPT | Performed by: STUDENT IN AN ORGANIZED HEALTH CARE EDUCATION/TRAINING PROGRAM

## 2023-11-06 PROCEDURE — 85652 RBC SED RATE AUTOMATED: CPT | Performed by: STUDENT IN AN ORGANIZED HEALTH CARE EDUCATION/TRAINING PROGRAM

## 2023-11-06 PROCEDURE — 80053 COMPREHEN METABOLIC PANEL: CPT | Performed by: STUDENT IN AN ORGANIZED HEALTH CARE EDUCATION/TRAINING PROGRAM

## 2023-11-06 PROCEDURE — 85025 COMPLETE CBC W/AUTO DIFF WBC: CPT | Performed by: STUDENT IN AN ORGANIZED HEALTH CARE EDUCATION/TRAINING PROGRAM

## 2023-11-06 PROCEDURE — 84550 ASSAY OF BLOOD/URIC ACID: CPT | Performed by: STUDENT IN AN ORGANIZED HEALTH CARE EDUCATION/TRAINING PROGRAM

## 2023-11-06 PROCEDURE — 36415 COLL VENOUS BLD VENIPUNCTURE: CPT | Mod: PN | Performed by: STUDENT IN AN ORGANIZED HEALTH CARE EDUCATION/TRAINING PROGRAM

## 2023-11-06 PROCEDURE — 84165 PATHOLOGIST INTERPRETATION SPE: ICD-10-PCS | Mod: 26,,, | Performed by: PATHOLOGY

## 2023-11-06 PROCEDURE — 82728 ASSAY OF FERRITIN: CPT | Performed by: STUDENT IN AN ORGANIZED HEALTH CARE EDUCATION/TRAINING PROGRAM

## 2023-11-06 PROCEDURE — 86334 PATHOLOGIST INTERPRETATION IFE: ICD-10-PCS | Mod: 26,,, | Performed by: PATHOLOGY

## 2023-11-06 PROCEDURE — 84466 ASSAY OF TRANSFERRIN: CPT | Performed by: STUDENT IN AN ORGANIZED HEALTH CARE EDUCATION/TRAINING PROGRAM

## 2023-11-06 NOTE — TELEPHONE ENCOUNTER
----- Message from Haley Farnsworth sent at 11/6/2023  7:33 AM CST -----  Patient would like to know if she can get her A1C checked.

## 2023-11-06 NOTE — TELEPHONE ENCOUNTER
Lov 11/3/23  Pt came in this morning for labs. She was asking for order for a hba1c. Note: pt is not diabetic. I see her last a1c from Jan 2021 was 5.1.

## 2023-11-07 LAB
ALBUMIN SERPL ELPH-MCNC: 4.26 G/DL (ref 3.35–5.55)
ALPHA1 GLOB SERPL ELPH-MCNC: 0.3 G/DL (ref 0.17–0.41)
ALPHA2 GLOB SERPL ELPH-MCNC: 0.72 G/DL (ref 0.43–0.99)
B-GLOBULIN SERPL ELPH-MCNC: 1.22 G/DL (ref 0.5–1.1)
GAMMA GLOB SERPL ELPH-MCNC: 0.7 G/DL (ref 0.67–1.58)
PATHOLOGIST INTERPRETATION SPE: NORMAL
PROT SERPL-MCNC: 7.2 G/DL (ref 6–8.4)

## 2023-11-08 LAB
INTERPRETATION SERPL IFE-IMP: NORMAL
PATHOLOGIST INTERPRETATION IFE: NORMAL

## 2023-12-23 ENCOUNTER — OFFICE VISIT (OUTPATIENT)
Dept: URGENT CARE | Facility: CLINIC | Age: 83
End: 2023-12-23
Payer: MEDICARE

## 2023-12-23 VITALS
BODY MASS INDEX: 28.63 KG/M2 | WEIGHT: 142 LBS | SYSTOLIC BLOOD PRESSURE: 160 MMHG | HEIGHT: 59 IN | OXYGEN SATURATION: 95 % | RESPIRATION RATE: 20 BRPM | TEMPERATURE: 98 F | DIASTOLIC BLOOD PRESSURE: 94 MMHG | HEART RATE: 80 BPM

## 2023-12-23 DIAGNOSIS — J32.9 RHINOSINUSITIS: Primary | ICD-10-CM

## 2023-12-23 LAB
CTP QC/QA: YES
CTP QC/QA: YES
POC MOLECULAR INFLUENZA A AGN: NEGATIVE
POC MOLECULAR INFLUENZA B AGN: NEGATIVE
SARS-COV-2 AG RESP QL IA.RAPID: NEGATIVE

## 2023-12-23 PROCEDURE — 87502 POCT INFLUENZA A/B MOLECULAR: ICD-10-PCS | Mod: QW,S$GLB,, | Performed by: NURSE PRACTITIONER

## 2023-12-23 PROCEDURE — 99213 OFFICE O/P EST LOW 20 MIN: CPT | Mod: S$GLB,,, | Performed by: NURSE PRACTITIONER

## 2023-12-23 PROCEDURE — 87811 SARS CORONAVIRUS 2 ANTIGEN POCT, MANUAL READ: ICD-10-PCS | Mod: QW,S$GLB,, | Performed by: NURSE PRACTITIONER

## 2023-12-23 PROCEDURE — 87502 INFLUENZA DNA AMP PROBE: CPT | Mod: QW,S$GLB,, | Performed by: NURSE PRACTITIONER

## 2023-12-23 PROCEDURE — 99213 PR OFFICE/OUTPT VISIT, EST, LEVL III, 20-29 MIN: ICD-10-PCS | Mod: S$GLB,,, | Performed by: NURSE PRACTITIONER

## 2023-12-23 PROCEDURE — 87811 SARS-COV-2 COVID19 W/OPTIC: CPT | Mod: QW,S$GLB,, | Performed by: NURSE PRACTITIONER

## 2023-12-23 RX ORDER — BENZONATATE 200 MG/1
200 CAPSULE ORAL 3 TIMES DAILY PRN
Qty: 30 CAPSULE | Refills: 0 | OUTPATIENT
Start: 2023-12-23 | End: 2023-12-29

## 2023-12-23 RX ORDER — MONTELUKAST SODIUM 10 MG/1
10 TABLET ORAL NIGHTLY
Qty: 30 TABLET | Refills: 0 | Status: SHIPPED | OUTPATIENT
Start: 2023-12-23 | End: 2024-01-22

## 2023-12-23 NOTE — PROGRESS NOTES
"Subjective:      Patient ID: Dorothy Mantilla is a 83 y.o. female.    Vitals:  height is 4' 11" (1.499 m) and weight is 64.4 kg (142 lb). Her oral temperature is 97.6 °F (36.4 °C). Her blood pressure is 160/94 (abnormal) and her pulse is 80. Her respiration is 20 and oxygen saturation is 95%.     Chief Complaint: Cough    Cough  This is a new problem. The current episode started today. The problem has been gradually worsening. The problem occurs every few minutes. The cough is Non-productive. Associated symptoms include chest pain, nasal congestion and postnasal drip. Pertinent negatives include no chills, ear congestion, ear pain, fever, headaches, heartburn, hemoptysis, myalgias, rash, rhinorrhea, sore throat, shortness of breath, sweats, weight loss or wheezing. Treatments tried: tylenol. The treatment provided mild relief.       Constitution: Negative for chills and fever.   HENT:  Positive for postnasal drip. Negative for ear pain and sore throat.    Cardiovascular:  Positive for chest pain.   Respiratory:  Positive for cough. Negative for bloody sputum, shortness of breath and wheezing.    Gastrointestinal:  Negative for heartburn.   Musculoskeletal:  Negative for muscle ache.   Skin:  Negative for rash.   Neurological:  Negative for headaches.      Objective:     Physical Exam   HENT:   Head: Normocephalic.   Ears:   Right Ear: Tympanic membrane, external ear and ear canal normal.   Left Ear: Tympanic membrane, external ear and ear canal normal.   Nose: Nose normal.   Mouth/Throat: Mucous membranes are moist. Oropharynx is clear.   Cardiovascular: Normal rate and regular rhythm.   Pulmonary/Chest: Effort normal and breath sounds normal.   Neurological: She is alert.       Assessment:     1. Cough, unspecified type        Plan:       Cough, unspecified type  -     SARS Coronavirus 2 Antigen, POCT Manual Read  -     POCT Influenza A/B MOLECULAR      Results for orders placed or performed in visit on 12/23/23 "   SARS Coronavirus 2 Antigen, POCT Manual Read   Result Value Ref Range    SARS Coronavirus 2 Antigen Negative Negative     Acceptable Yes    POCT Influenza A/B MOLECULAR   Result Value Ref Range    POC Molecular Influenza A Ag Negative Negative, Not Reported    POC Molecular Influenza B Ag Negative Negative, Not Reported     Acceptable Yes      Patient Instructions   There are many treatment options for sinusitis, depending on your symptoms and how long youve had them. You can treat a sinus infection at home with:    Decongestants.  Over-the-counter (OTC) cold and allergy medications.  Nasal saline rinses.  Drinking plenty of fluids.  If symptoms of sinusitis dont improve after 10 days, a provider may prescribe:    Antibiotics.  Oral or topical decongestants.  Prescription intranasal steroid sprays. (Dont use nonprescription sprays or drops for longer than three to five days -- they may actually increase congestion.)  Providers treat chronic sinusitis by focusing on the underlying condition. Treatments can include:    Intranasal steroid sprays.  Topical antihistamine sprays or oral pills.  Leukotriene antagonists, like montelukast.  Surgery to treat structural issues, polyps or fungal infections

## 2023-12-29 ENCOUNTER — HOSPITAL ENCOUNTER (EMERGENCY)
Facility: HOSPITAL | Age: 83
Discharge: HOME | End: 2023-12-29
Attending: STUDENT IN AN ORGANIZED HEALTH CARE EDUCATION/TRAINING PROGRAM
Payer: MEDICARE

## 2023-12-29 VITALS
SYSTOLIC BLOOD PRESSURE: 131 MMHG | RESPIRATION RATE: 18 BRPM | DIASTOLIC BLOOD PRESSURE: 73 MMHG | BODY MASS INDEX: 27.82 KG/M2 | TEMPERATURE: 98 F | HEART RATE: 75 BPM | WEIGHT: 138 LBS | OXYGEN SATURATION: 95 % | HEIGHT: 59 IN

## 2023-12-29 DIAGNOSIS — R05.9 COUGH: ICD-10-CM

## 2023-12-29 DIAGNOSIS — J40 BRONCHITIS: Primary | ICD-10-CM

## 2023-12-29 LAB
ANION GAP SERPL CALC-SCNC: 10 MMOL/L (ref 8–16)
BASOPHILS # BLD AUTO: 0.08 K/UL (ref 0–0.2)
BASOPHILS NFR BLD: 1.4 % (ref 0–1.9)
BUN SERPL-MCNC: 17 MG/DL (ref 8–23)
CALCIUM SERPL-MCNC: 9.8 MG/DL (ref 8.7–10.5)
CHLORIDE SERPL-SCNC: 103 MMOL/L (ref 95–110)
CO2 SERPL-SCNC: 28 MMOL/L (ref 23–29)
CREAT SERPL-MCNC: 0.7 MG/DL (ref 0.5–1.4)
DIFFERENTIAL METHOD BLD: ABNORMAL
EOSINOPHIL # BLD AUTO: 0.3 K/UL (ref 0–0.5)
EOSINOPHIL NFR BLD: 5.1 % (ref 0–8)
ERYTHROCYTE [DISTWIDTH] IN BLOOD BY AUTOMATED COUNT: 13.8 % (ref 11.5–14.5)
EST. GFR  (NO RACE VARIABLE): >60 ML/MIN/1.73 M^2
GLUCOSE SERPL-MCNC: 98 MG/DL (ref 70–110)
HCT VFR BLD AUTO: 43.3 % (ref 37–48.5)
HGB BLD-MCNC: 14.6 G/DL (ref 12–16)
IMM GRANULOCYTES # BLD AUTO: 0.06 K/UL (ref 0–0.04)
IMM GRANULOCYTES NFR BLD AUTO: 1.1 % (ref 0–0.5)
INFLUENZA A, MOLECULAR: NOT DETECTED
INFLUENZA B, MOLECULAR: NOT DETECTED
LYMPHOCYTES # BLD AUTO: 1.4 K/UL (ref 1–4.8)
LYMPHOCYTES NFR BLD: 24 % (ref 18–48)
MCH RBC QN AUTO: 29.9 PG (ref 27–31)
MCHC RBC AUTO-ENTMCNC: 33.7 G/DL (ref 32–36)
MCV RBC AUTO: 89 FL (ref 82–98)
MONOCYTES # BLD AUTO: 0.5 K/UL (ref 0.3–1)
MONOCYTES NFR BLD: 8.3 % (ref 4–15)
NEUTROPHILS # BLD AUTO: 3.4 K/UL (ref 1.8–7.7)
NEUTROPHILS NFR BLD: 60.1 % (ref 38–73)
NRBC BLD-RTO: 0 /100 WBC
PLATELET # BLD AUTO: 270 K/UL (ref 150–450)
PMV BLD AUTO: 8.4 FL (ref 9.2–12.9)
POTASSIUM SERPL-SCNC: 4.1 MMOL/L (ref 3.5–5.1)
RBC # BLD AUTO: 4.89 M/UL (ref 4–5.4)
RSV AG BY MOLECULAR METHOD: NOT DETECTED
SARS-COV-2 RNA RESP QL NAA+PROBE: NOT DETECTED
SODIUM SERPL-SCNC: 141 MMOL/L (ref 136–145)
WBC # BLD AUTO: 5.67 K/UL (ref 3.9–12.7)

## 2023-12-29 PROCEDURE — 0241U SARS-COV2 (COVID) WITH FLU/RSV BY PCR: CPT | Performed by: STUDENT IN AN ORGANIZED HEALTH CARE EDUCATION/TRAINING PROGRAM

## 2023-12-29 PROCEDURE — 99285 EMERGENCY DEPT VISIT HI MDM: CPT | Mod: 25

## 2023-12-29 PROCEDURE — 93005 ELECTROCARDIOGRAM TRACING: CPT

## 2023-12-29 PROCEDURE — 85025 COMPLETE CBC W/AUTO DIFF WBC: CPT | Performed by: STUDENT IN AN ORGANIZED HEALTH CARE EDUCATION/TRAINING PROGRAM

## 2023-12-29 PROCEDURE — 80048 BASIC METABOLIC PNL TOTAL CA: CPT | Performed by: STUDENT IN AN ORGANIZED HEALTH CARE EDUCATION/TRAINING PROGRAM

## 2023-12-29 PROCEDURE — 93010 ELECTROCARDIOGRAM REPORT: CPT | Mod: ,,, | Performed by: INTERNAL MEDICINE

## 2023-12-29 RX ORDER — ALBUTEROL SULFATE 90 UG/1
1-2 AEROSOL, METERED RESPIRATORY (INHALATION) EVERY 6 HOURS PRN
Qty: 6.7 G | Refills: 0 | Status: SHIPPED | OUTPATIENT
Start: 2023-12-29 | End: 2024-01-05

## 2023-12-29 RX ORDER — BENZONATATE 100 MG/1
100 CAPSULE ORAL 3 TIMES DAILY PRN
Qty: 15 CAPSULE | Refills: 0 | Status: SHIPPED | OUTPATIENT
Start: 2023-12-29

## 2023-12-29 RX ORDER — METHYLPREDNISOLONE 4 MG/1
TABLET ORAL
Qty: 21 EACH | Refills: 0 | Status: SHIPPED | OUTPATIENT
Start: 2023-12-29 | End: 2024-01-19

## 2023-12-29 NOTE — ED TRIAGE NOTES
Patient presents to the ED today with reports of cough on congestion onset 1 week ago. Patient reports productive cough described as green in color. Patient endorses mild SOB at this time. Prescribed Z-pack and cough syrup but states also finish and still having symptoms    Patient identifiers verified and correct for Dorothy Jose Rafael  LOC: The patient is awake, alert and aware of environment with an appropriate affect, the patient is oriented x 3 and speaking appropriately.   APPEARANCE: Patient appears comfortable and in no acute distress, patient is clean and well groomed.  SKIN: The skin is warm and dry, color consistent with ethnicity, patient has normal skin turgor and moist mucus membranes, skin intact, no breakdown or bruising noted.   MUSCULOSKELETAL: Patient moving all extremities spontaneously, no swelling noted.  RESPIRATORY: Airway is open and patent, respirations are spontaneous, patient has a normal effort and rate, no accessory muscle use noted, O2 sats noted at 97% on room air. Cough congestion  CARDIAC: Denies chest pain HR 88  GASTRO: Soft and non tender to palpation, no distention noted, normoactive bowel sounds present in all four quadrants. Pt states bowel movements have been regular.  : Pt denies any pain or frequency with urination.  NEURO: Pt opens eyes spontaneously, behavior appropriate to situation, follows commands, facial expression symmetrical, bilateral hand grasp equal and even, purposeful motor response noted, normal sensation in all extremities when touched with a finger.

## 2023-12-29 NOTE — DISCHARGE INSTRUCTIONS
You were seen today for a viral illness with ongoing cough.  Please take the Medrol Dosepak as prescribed.  You can use the albuterol for any wheezing or shortness of breath.  You have also been represcribed Tessalon Perles to help with your cough.  If you develop any worsening symptoms or fever, difficulty breathing, chest pain or any other concerns, you should return to the emergency department for re-evaluation.  Please follow-up with your primary care doctor within the next week.

## 2023-12-29 NOTE — ED PROVIDER NOTES
Encounter Date: 12/29/2023       History     Chief Complaint   Patient presents with    URI     Seen at  5 d ago, taking z cuong, coughing up green, hoarse , tested neg for every thing 5 d, hx bronchitis     HPI  Patient is a very pleasant 83-year-old female with history of hypertension, GERD who presents for ongoing cough.  Patient states that she initially started to have cough and congestion 1 week ago and was seen at a local urgent care by her house.  She was prescribed a Z-Cuong as well as montelukast for her symptoms.  She tested negative for COVID and flu at that time.  She states that she initially had more energy a few days ago but then last night started to have increased cough with sputum production.  She states that sometimes this sputum appears green in color.  She states that she was up all night and had difficulty sleeping due to the persistent cough.  She has been taking over-the-counter Robitussin as well as Sudafed for her congestion and cough.  She states that she is almost done with her Z-Cuogn in his concerned because she continues to have persistent symptoms.  She denies any chest pain.  She reports mild shortness of breath but with only coughing.  No lower extremity swelling or edema.  No fever, chills, abdominal pain, vomiting, diarrhea, sore throat, urinary symptoms.    Review of patient's allergies indicates:   Allergen Reactions    Reclast [zoledronic acid-mannitol-water] Swelling    Codeine Hives and Edema    Latex, natural rubber Hives    Ciprofloxacin Diarrhea and Nausea And Vomiting     Past Medical History:   Diagnosis Date    Bradycardia     GERD (gastroesophageal reflux disease)     Hyperlipidemia      Past Surgical History:   Procedure Laterality Date    ADENOIDECTOMY      APPENDECTOMY      BRAIN SURGERY Left     lumpectomy    BUNIONECTOMY Bilateral     COSMETIC SURGERY      INSERTION OF PACEMAKER      JOINT REPLACEMENT Bilateral     knee replacements    SURGICAL REMOVAL OF EXOSTOSIS  OF TOE      one toe on each foot    TONSILLECTOMY       Family History   Problem Relation Age of Onset    Hypertension Mother          at age 99    Diabetes Mother     Parkinsonism Father     Hypertension Father     Colon cancer Father     Cancer Daughter         skin    Basal cell carcinoma Son     Stroke Paternal Grandfather     Hypertension Paternal Grandfather      Social History     Tobacco Use    Smoking status: Never    Smokeless tobacco: Never   Substance Use Topics    Alcohol use: Yes     Alcohol/week: 4.0 standard drinks of alcohol     Types: 4 Glasses of wine per week    Drug use: Not Currently     Review of Systems  Full review of systems was obtained, see HPI for pertinent positives.    Physical Exam     Initial Vitals [23 0919]   BP Pulse Resp Temp SpO2   135/84 88 18 98.4 °F (36.9 °C) 96 %      MAP       --         Physical Exam  Constitutional: No acute distress, well-appearing, nontoxic  HENT:  Normocephalic, atraumatic  Respiratory: Non-labored, lungs clear bilaterally  Cardiovascular: Well perfused, normal rate, regular rhythm, no lower extremity edema  Gastrointestinal: Soft, non-tender, non-distended  Integumentary: Warm and dry  Musculoskeletal: No deformity, no unilateral leg swelling, warmth or erythema  Neurological: Awake and alert  Psychiatric: Cooperative     ED Course   Procedures  Labs Reviewed   CBC W/ AUTO DIFFERENTIAL - Abnormal; Notable for the following components:       Result Value    MPV 8.4 (*)     Immature Granulocytes 1.1 (*)     Immature Grans (Abs) 0.06 (*)     All other components within normal limits   SARS-COV2 (COVID) WITH FLU/RSV BY PCR   BASIC METABOLIC PANEL          Imaging Results              X-Ray Chest PA And Lateral (Final result)  Result time 23 11:20:24      Final result by Mio Tejada MD (23 11:20:24)                   Impression:      No significant intrathoracic abnormality.      Electronically signed by: Mio Tejada  MD  Date:    12/29/2023  Time:    11:20               Narrative:    EXAMINATION:  XR CHEST PA AND LATERAL    TECHNIQUE:  Two views of the chest were obtained, with PA and lateral projections submitted.    COMPARISON:  No prior chest radiographs are currently available for comparison purposes, though reference is made to an examination of the thoracic spine dated 06/07/2023.  Clinical information of URI/cough.    FINDINGS:  Cardiac pacemaker noted.  Heart size is normal, as is the appearance of the pulmonary vascularity.  Lung zones are clear, and are free of significant air space consolidation or volume loss.  No pleural fluid.  No hilar or mediastinal mass lesion.  Note is made of scoliosis convex to the right near the thoracic inlet, and of the presence of compression deformities with moderate to severe loss of height at T8 and mild loss of height at T9.  These compression deformities do not represent recent fractures, as they can be documented on the thoracic spine study of 06/07/2023.                                       Medications - No data to display  Medical Decision Making  Patient is an 83-year-old female who presents for a viral syndrome.  She has been having cough and congestion for the last week.  She initially had decreased activity levels and felt a little better in the middle of the week but then last night started to feel weaker with persistent cough now with productive green sputum.  Her voice is little hoarse on exam but she has no respiratory distress.  Her lungs are clear bilaterally.  Normal rate, regular rhythm.  She is afebrile here.  She denies any fevers at home.  Her main symptom is the ongoing cough.  She has been taking a Z-Cuong in his still currently taking this but has 1 day left.  She has not having any chest pain and states she only feels mildly short of breath with coughing only.  Will obtain chest x-ray to assess for pneumonia.  She has not hypoxic or tachycardic here.  Will obtain  an ambulatory oxygen saturation.  Will also check basic labs and EKG.  I suspect the patient likely has bronchitis which we can treat symptomatically.    Labs, chest x-ray and EKG reviewed.  Chest x-ray without any cardiopulmonary abnormality.  No evidence of pneumonia.  Labs also reassuring.  Patient able to ambulate without becoming short of breath or labored.  Ambulatory sats 95% on room air.  Overall, patient remains very well-appearing.  Will go ahead and treat her symptomatically for bronchitis.  She was prescribed albuterol inhaler as you for cough and shortness of breath.  Will also prescribed her Tessalon Perles for the cough as well as a Medrol Dosepak.  She was counseled on return precautions prior to discharge and advised to follow up with the primary care doctor within the next week.    Amount and/or Complexity of Data Reviewed  Labs: ordered.  Radiology: ordered.  ECG/medicine tests: ordered and independent interpretation performed. Decision-making details documented in ED Course.    Risk  Prescription drug management.               ED Course as of 12/29/23 1320   Fri Dec 29, 2023   1100 EKG with normal sinus rhythm, rate 80, incomplete right bundle-branch block present, no STEMI      [NN]   1101 Ambulatory sat 95% on RA [NN]   1133 Chest x-ray negative for acute cardiopulmonary abnormality. [NN]      ED Course User Index  [NN] Melissa Tao MD                           Clinical Impression:  Final diagnoses:  [R05.9] Cough  [J40] Bronchitis (Primary)          ED Disposition Condition    Discharge Stable          ED Prescriptions       Medication Sig Dispense Start Date End Date Auth. Provider    albuterol (PROVENTIL/VENTOLIN HFA) 90 mcg/actuation inhaler Inhale 1-2 puffs into the lungs every 6 (six) hours as needed for Wheezing. Rescue 6.7 g 12/29/2023 1/5/2024 Melissa Tao MD    methylPREDNISolone (MEDROL DOSEPACK) 4 mg tablet use as directed 21 each 12/29/2023 1/19/2024 Melissa Tao  MD EMELY    benzonatate (TESSALON) 100 MG capsule Take 1 capsule (100 mg total) by mouth 3 (three) times daily as needed for Cough. 15 capsule 12/29/2023 -- Melissa Tao MD          Follow-up Information       Follow up With Specialties Details Why Contact Info    Opal Zavala MD Family Medicine Schedule an appointment as soon as possible for a visit   [address]  [facility] [id number] [phone]      [facility] - Emergency Dept Emergency Medicine  As needed, If symptoms worsen [address] [facility] [address] [phone]             Melissa Tao MD  12/29/23 [id number]

## 2024-01-11 DIAGNOSIS — Z00.00 ENCOUNTER FOR MEDICARE ANNUAL WELLNESS EXAM: ICD-10-CM

## 2024-02-05 PROBLEM — Z00.00 ANNUAL PHYSICAL EXAM: Status: RESOLVED | Noted: 2023-11-03 | Resolved: 2024-02-05

## 2024-07-19 ENCOUNTER — TELEPHONE (OUTPATIENT)
Dept: PRIMARY CARE CLINIC | Facility: CLINIC | Age: 84
End: 2024-07-19
Payer: MEDICARE

## 2024-08-07 ENCOUNTER — TELEPHONE (OUTPATIENT)
Dept: PRIMARY CARE CLINIC | Facility: CLINIC | Age: 84
End: 2024-08-07
Payer: MEDICARE

## 2024-08-12 ENCOUNTER — TELEPHONE (OUTPATIENT)
Dept: PRIMARY CARE CLINIC | Facility: CLINIC | Age: 84
End: 2024-08-12
Payer: MEDICARE

## 2024-11-12 ENCOUNTER — TELEPHONE (OUTPATIENT)
Dept: PRIMARY CARE CLINIC | Facility: CLINIC | Age: 84
End: 2024-11-12
Payer: MEDICARE

## 2024-12-05 ENCOUNTER — TELEPHONE (OUTPATIENT)
Dept: PRIMARY CARE CLINIC | Facility: CLINIC | Age: 84
End: 2024-12-05
Payer: MEDICARE

## 2025-03-13 ENCOUNTER — TELEPHONE (OUTPATIENT)
Dept: PRIMARY CARE CLINIC | Facility: CLINIC | Age: 85
End: 2025-03-13
Payer: MEDICARE

## 2025-03-13 NOTE — TELEPHONE ENCOUNTER
Spoke to pt. Who informed me her  is sick and at this time they are both out of town and will call to schedule an appointment . However pt is not sure when she will be back in town